# Patient Record
Sex: MALE | Race: WHITE | Employment: OTHER | URBAN - METROPOLITAN AREA
[De-identification: names, ages, dates, MRNs, and addresses within clinical notes are randomized per-mention and may not be internally consistent; named-entity substitution may affect disease eponyms.]

---

## 2017-04-01 ENCOUNTER — HOSPITAL ENCOUNTER (OUTPATIENT)
Dept: OTHER | Age: 67
Discharge: HOME | End: 2017-04-01

## 2017-04-01 DIAGNOSIS — R50.9 FEVER AND CHILLS: ICD-10-CM

## 2017-04-01 DIAGNOSIS — R05.9 COUGH: ICD-10-CM

## 2019-10-03 ENCOUNTER — OFFICE VISIT (OUTPATIENT)
Dept: CARDIOLOGY CLINIC | Age: 69
End: 2019-10-03
Payer: COMMERCIAL

## 2019-10-03 VITALS
HEART RATE: 62 BPM | HEIGHT: 67 IN | BODY MASS INDEX: 29.51 KG/M2 | WEIGHT: 188 LBS | SYSTOLIC BLOOD PRESSURE: 124 MMHG | DIASTOLIC BLOOD PRESSURE: 64 MMHG

## 2019-10-03 DIAGNOSIS — I25.10 ATHEROSCLEROSIS OF NATIVE CORONARY ARTERY OF NATIVE HEART WITHOUT ANGINA PECTORIS: ICD-10-CM

## 2019-10-03 DIAGNOSIS — I10 ESSENTIAL HYPERTENSION, BENIGN: ICD-10-CM

## 2019-10-03 DIAGNOSIS — Z95.1 S/P CABG X 1: Primary | ICD-10-CM

## 2019-10-03 PROCEDURE — 93000 ELECTROCARDIOGRAM COMPLETE: CPT | Performed by: INTERNAL MEDICINE

## 2019-10-03 PROCEDURE — 99204 OFFICE O/P NEW MOD 45 MIN: CPT | Performed by: INTERNAL MEDICINE

## 2019-10-03 PROCEDURE — 1036F TOBACCO NON-USER: CPT | Performed by: INTERNAL MEDICINE

## 2019-10-03 PROCEDURE — G8419 CALC BMI OUT NRM PARAM NOF/U: HCPCS | Performed by: INTERNAL MEDICINE

## 2019-10-03 PROCEDURE — G8427 DOCREV CUR MEDS BY ELIG CLIN: HCPCS | Performed by: INTERNAL MEDICINE

## 2019-10-03 PROCEDURE — 1123F ACP DISCUSS/DSCN MKR DOCD: CPT | Performed by: INTERNAL MEDICINE

## 2019-10-03 PROCEDURE — 4040F PNEUMOC VAC/ADMIN/RCVD: CPT | Performed by: INTERNAL MEDICINE

## 2019-10-03 PROCEDURE — G8598 ASA/ANTIPLAT THER USED: HCPCS | Performed by: INTERNAL MEDICINE

## 2019-10-03 PROCEDURE — G8484 FLU IMMUNIZE NO ADMIN: HCPCS | Performed by: INTERNAL MEDICINE

## 2019-10-03 PROCEDURE — 3017F COLORECTAL CA SCREEN DOC REV: CPT | Performed by: INTERNAL MEDICINE

## 2019-10-03 RX ORDER — TERAZOSIN 2 MG/1
5 CAPSULE ORAL NIGHTLY
COMMUNITY

## 2019-10-09 RX ORDER — IBUPROFEN 800 MG/1
800 TABLET ORAL NIGHTLY
COMMUNITY

## 2019-10-11 ENCOUNTER — ANESTHESIA EVENT (OUTPATIENT)
Dept: OPERATING ROOM | Age: 69
End: 2019-10-11
Payer: COMMERCIAL

## 2019-10-11 ENCOUNTER — HOSPITAL ENCOUNTER (OUTPATIENT)
Age: 69
Setting detail: OUTPATIENT SURGERY
Discharge: HOME | End: 2019-10-11
Attending: OPHTHALMOLOGY | Admitting: OPHTHALMOLOGY
Payer: COMMERCIAL

## 2019-10-11 ENCOUNTER — ANESTHESIA (OUTPATIENT)
Dept: OPERATING ROOM | Age: 69
End: 2019-10-11
Payer: COMMERCIAL

## 2019-10-11 VITALS
OXYGEN SATURATION: 98 % | HEIGHT: 67 IN | WEIGHT: 187 LBS | HEART RATE: 57 BPM | DIASTOLIC BLOOD PRESSURE: 70 MMHG | SYSTOLIC BLOOD PRESSURE: 149 MMHG | RESPIRATION RATE: 16 BRPM | BODY MASS INDEX: 29.35 KG/M2 | TEMPERATURE: 97.2 F

## 2019-10-11 VITALS — SYSTOLIC BLOOD PRESSURE: 148 MMHG | DIASTOLIC BLOOD PRESSURE: 82 MMHG | OXYGEN SATURATION: 100 %

## 2019-10-11 PROCEDURE — 6370000000 HC RX 637 (ALT 250 FOR IP): Performed by: OPHTHALMOLOGY

## 2019-10-11 PROCEDURE — 2709999900 HC NON-CHARGEABLE SUPPLY: Performed by: OPHTHALMOLOGY

## 2019-10-11 PROCEDURE — 3700000000 HC ANESTHESIA ATTENDED CARE: Performed by: OPHTHALMOLOGY

## 2019-10-11 PROCEDURE — 2580000003 HC RX 258: Performed by: OPHTHALMOLOGY

## 2019-10-11 PROCEDURE — 2500000003 HC RX 250 WO HCPCS: Performed by: NURSE ANESTHETIST, CERTIFIED REGISTERED

## 2019-10-11 PROCEDURE — 2720000010 HC SURG SUPPLY STERILE: Performed by: OPHTHALMOLOGY

## 2019-10-11 PROCEDURE — 6360000002 HC RX W HCPCS: Performed by: OPHTHALMOLOGY

## 2019-10-11 PROCEDURE — V2632 POST CHMBR INTRAOCULAR LENS: HCPCS | Performed by: OPHTHALMOLOGY

## 2019-10-11 PROCEDURE — 3700000001 HC ADD 15 MINUTES (ANESTHESIA): Performed by: OPHTHALMOLOGY

## 2019-10-11 PROCEDURE — 7100000010 HC PHASE II RECOVERY - FIRST 15 MIN: Performed by: OPHTHALMOLOGY

## 2019-10-11 PROCEDURE — 6360000002 HC RX W HCPCS: Performed by: NURSE ANESTHETIST, CERTIFIED REGISTERED

## 2019-10-11 PROCEDURE — 6370000000 HC RX 637 (ALT 250 FOR IP)

## 2019-10-11 PROCEDURE — 2580000003 HC RX 258: Performed by: ANESTHESIOLOGY

## 2019-10-11 PROCEDURE — 3600000003 HC SURGERY LEVEL 3 BASE: Performed by: OPHTHALMOLOGY

## 2019-10-11 PROCEDURE — 3600000013 HC SURGERY LEVEL 3 ADDTL 15MIN: Performed by: OPHTHALMOLOGY

## 2019-10-11 PROCEDURE — 7100000011 HC PHASE II RECOVERY - ADDTL 15 MIN: Performed by: OPHTHALMOLOGY

## 2019-10-11 PROCEDURE — 2500000003 HC RX 250 WO HCPCS: Performed by: OPHTHALMOLOGY

## 2019-10-11 DEVICE — ACRYSOF(R) IQ ASPHERIC IOL SP ACRYLIC FOLDABLELENS WULTRASERT(TM) DELIVERY SYSTEM UV WBLUE LIGHT FILTER. 13.0MM LENGTH 6.0MM ANTERIORASYMMETRIC BICONVEX OPTIC PLANAR HAPTICS.
Type: IMPLANTABLE DEVICE | Site: EYE | Status: FUNCTIONAL
Brand: ACRYSOF ULTRASERT

## 2019-10-11 RX ORDER — SODIUM CHLORIDE, SODIUM LACTATE, POTASSIUM CHLORIDE, CALCIUM CHLORIDE 600; 310; 30; 20 MG/100ML; MG/100ML; MG/100ML; MG/100ML
INJECTION, SOLUTION INTRAVENOUS CONTINUOUS
Status: DISCONTINUED | OUTPATIENT
Start: 2019-10-11 | End: 2019-10-11 | Stop reason: HOSPADM

## 2019-10-11 RX ORDER — MAGNESIUM HYDROXIDE 1200 MG/15ML
LIQUID ORAL PRN
Status: DISCONTINUED | OUTPATIENT
Start: 2019-10-11 | End: 2019-10-11 | Stop reason: ALTCHOICE

## 2019-10-11 RX ORDER — OXYCODONE HYDROCHLORIDE AND ACETAMINOPHEN 5; 325 MG/1; MG/1
2 TABLET ORAL PRN
Status: DISCONTINUED | OUTPATIENT
Start: 2019-10-11 | End: 2019-10-11 | Stop reason: HOSPADM

## 2019-10-11 RX ORDER — OXYCODONE HYDROCHLORIDE AND ACETAMINOPHEN 5; 325 MG/1; MG/1
1 TABLET ORAL PRN
Status: DISCONTINUED | OUTPATIENT
Start: 2019-10-11 | End: 2019-10-11 | Stop reason: HOSPADM

## 2019-10-11 RX ORDER — MORPHINE SULFATE 2 MG/ML
1 INJECTION, SOLUTION INTRAMUSCULAR; INTRAVENOUS EVERY 5 MIN PRN
Status: DISCONTINUED | OUTPATIENT
Start: 2019-10-11 | End: 2019-10-11 | Stop reason: HOSPADM

## 2019-10-11 RX ORDER — LABETALOL HYDROCHLORIDE 5 MG/ML
5 INJECTION, SOLUTION INTRAVENOUS EVERY 10 MIN PRN
Status: DISCONTINUED | OUTPATIENT
Start: 2019-10-11 | End: 2019-10-11 | Stop reason: HOSPADM

## 2019-10-11 RX ORDER — MEPERIDINE HYDROCHLORIDE 50 MG/ML
12.5 INJECTION INTRAMUSCULAR; INTRAVENOUS; SUBCUTANEOUS EVERY 5 MIN PRN
Status: DISCONTINUED | OUTPATIENT
Start: 2019-10-11 | End: 2019-10-11 | Stop reason: HOSPADM

## 2019-10-11 RX ORDER — MORPHINE SULFATE 2 MG/ML
2 INJECTION, SOLUTION INTRAMUSCULAR; INTRAVENOUS EVERY 5 MIN PRN
Status: DISCONTINUED | OUTPATIENT
Start: 2019-10-11 | End: 2019-10-11 | Stop reason: HOSPADM

## 2019-10-11 RX ORDER — LIDOCAINE HYDROCHLORIDE 20 MG/ML
INJECTION, SOLUTION EPIDURAL; INFILTRATION; INTRACAUDAL; PERINEURAL PRN
Status: DISCONTINUED | OUTPATIENT
Start: 2019-10-11 | End: 2019-10-11 | Stop reason: SDUPTHER

## 2019-10-11 RX ORDER — DIPHENHYDRAMINE HYDROCHLORIDE 50 MG/ML
12.5 INJECTION INTRAMUSCULAR; INTRAVENOUS
Status: DISCONTINUED | OUTPATIENT
Start: 2019-10-11 | End: 2019-10-11 | Stop reason: HOSPADM

## 2019-10-11 RX ORDER — PROPOFOL 10 MG/ML
INJECTION, EMULSION INTRAVENOUS PRN
Status: DISCONTINUED | OUTPATIENT
Start: 2019-10-11 | End: 2019-10-11 | Stop reason: SDUPTHER

## 2019-10-11 RX ORDER — HYDRALAZINE HYDROCHLORIDE 20 MG/ML
5 INJECTION INTRAMUSCULAR; INTRAVENOUS EVERY 10 MIN PRN
Status: DISCONTINUED | OUTPATIENT
Start: 2019-10-11 | End: 2019-10-11 | Stop reason: HOSPADM

## 2019-10-11 RX ORDER — PROMETHAZINE HYDROCHLORIDE 25 MG/ML
6.25 INJECTION, SOLUTION INTRAMUSCULAR; INTRAVENOUS
Status: DISCONTINUED | OUTPATIENT
Start: 2019-10-11 | End: 2019-10-11 | Stop reason: HOSPADM

## 2019-10-11 RX ORDER — ONDANSETRON 2 MG/ML
4 INJECTION INTRAMUSCULAR; INTRAVENOUS
Status: DISCONTINUED | OUTPATIENT
Start: 2019-10-11 | End: 2019-10-11 | Stop reason: HOSPADM

## 2019-10-11 RX ORDER — LIDOCAINE HYDROCHLORIDE 10 MG/ML
2 INJECTION, SOLUTION INFILTRATION; PERINEURAL
Status: DISCONTINUED | OUTPATIENT
Start: 2019-10-11 | End: 2019-10-11 | Stop reason: HOSPADM

## 2019-10-11 RX ORDER — BACITRACIN 500 [USP'U]/G
OINTMENT OPHTHALMIC PRN
Status: DISCONTINUED | OUTPATIENT
Start: 2019-10-11 | End: 2019-10-11 | Stop reason: ALTCHOICE

## 2019-10-11 RX ADMIN — SODIUM CHLORIDE, POTASSIUM CHLORIDE, SODIUM LACTATE AND CALCIUM CHLORIDE: 600; 310; 30; 20 INJECTION, SOLUTION INTRAVENOUS at 07:22

## 2019-10-11 RX ADMIN — SODIUM CHLORIDE, POTASSIUM CHLORIDE, SODIUM LACTATE AND CALCIUM CHLORIDE: 600; 310; 30; 20 INJECTION, SOLUTION INTRAVENOUS at 08:33

## 2019-10-11 RX ADMIN — Medication 0.3 ML: at 07:09

## 2019-10-11 RX ADMIN — PROPOFOL 80 MG: 10 INJECTION, EMULSION INTRAVENOUS at 08:37

## 2019-10-11 RX ADMIN — LIDOCAINE HYDROCHLORIDE 60 MG: 20 INJECTION, SOLUTION EPIDURAL; INFILTRATION; INTRACAUDAL; PERINEURAL at 08:37

## 2019-10-11 ASSESSMENT — PULMONARY FUNCTION TESTS
PIF_VALUE: 1

## 2019-10-11 ASSESSMENT — PAIN - FUNCTIONAL ASSESSMENT: PAIN_FUNCTIONAL_ASSESSMENT: 0-10

## 2019-10-11 ASSESSMENT — PAIN SCALES - GENERAL: PAINLEVEL_OUTOF10: 0

## 2019-10-24 ENCOUNTER — ANESTHESIA EVENT (OUTPATIENT)
Dept: OPERATING ROOM | Age: 69
End: 2019-10-24
Payer: MEDICARE

## 2019-10-25 ENCOUNTER — HOSPITAL ENCOUNTER (OUTPATIENT)
Age: 69
Setting detail: OUTPATIENT SURGERY
Discharge: HOME | End: 2019-10-25
Attending: OPHTHALMOLOGY | Admitting: OPHTHALMOLOGY
Payer: MEDICARE

## 2019-10-25 ENCOUNTER — ANESTHESIA (OUTPATIENT)
Dept: OPERATING ROOM | Age: 69
End: 2019-10-25
Payer: MEDICARE

## 2019-10-25 VITALS — DIASTOLIC BLOOD PRESSURE: 81 MMHG | OXYGEN SATURATION: 99 % | SYSTOLIC BLOOD PRESSURE: 143 MMHG

## 2019-10-25 VITALS
HEIGHT: 67 IN | WEIGHT: 180 LBS | BODY MASS INDEX: 28.25 KG/M2 | RESPIRATION RATE: 16 BRPM | OXYGEN SATURATION: 98 % | TEMPERATURE: 97.2 F | SYSTOLIC BLOOD PRESSURE: 148 MMHG | DIASTOLIC BLOOD PRESSURE: 81 MMHG | HEART RATE: 62 BPM

## 2019-10-25 PROCEDURE — 6370000000 HC RX 637 (ALT 250 FOR IP)

## 2019-10-25 PROCEDURE — 2720000010 HC SURG SUPPLY STERILE: Performed by: OPHTHALMOLOGY

## 2019-10-25 PROCEDURE — 3700000000 HC ANESTHESIA ATTENDED CARE: Performed by: OPHTHALMOLOGY

## 2019-10-25 PROCEDURE — 3700000001 HC ADD 15 MINUTES (ANESTHESIA): Performed by: OPHTHALMOLOGY

## 2019-10-25 PROCEDURE — 2500000003 HC RX 250 WO HCPCS: Performed by: ANESTHESIOLOGY

## 2019-10-25 PROCEDURE — 2709999900 HC NON-CHARGEABLE SUPPLY: Performed by: OPHTHALMOLOGY

## 2019-10-25 PROCEDURE — 7100000010 HC PHASE II RECOVERY - FIRST 15 MIN: Performed by: OPHTHALMOLOGY

## 2019-10-25 PROCEDURE — 6360000002 HC RX W HCPCS: Performed by: NURSE ANESTHETIST, CERTIFIED REGISTERED

## 2019-10-25 PROCEDURE — 2500000003 HC RX 250 WO HCPCS: Performed by: OPHTHALMOLOGY

## 2019-10-25 PROCEDURE — 3600000013 HC SURGERY LEVEL 3 ADDTL 15MIN: Performed by: OPHTHALMOLOGY

## 2019-10-25 PROCEDURE — 7100000011 HC PHASE II RECOVERY - ADDTL 15 MIN: Performed by: OPHTHALMOLOGY

## 2019-10-25 PROCEDURE — 3600000003 HC SURGERY LEVEL 3 BASE: Performed by: OPHTHALMOLOGY

## 2019-10-25 PROCEDURE — V2632 POST CHMBR INTRAOCULAR LENS: HCPCS | Performed by: OPHTHALMOLOGY

## 2019-10-25 PROCEDURE — 2580000003 HC RX 258: Performed by: OPHTHALMOLOGY

## 2019-10-25 PROCEDURE — 6360000002 HC RX W HCPCS: Performed by: OPHTHALMOLOGY

## 2019-10-25 PROCEDURE — 2580000003 HC RX 258: Performed by: ANESTHESIOLOGY

## 2019-10-25 PROCEDURE — 6370000000 HC RX 637 (ALT 250 FOR IP): Performed by: OPHTHALMOLOGY

## 2019-10-25 PROCEDURE — 2500000003 HC RX 250 WO HCPCS: Performed by: NURSE ANESTHETIST, CERTIFIED REGISTERED

## 2019-10-25 DEVICE — ACRYSOF(R) IQ ASPHERIC IOL SP ACRYLIC FOLDABLELENS WULTRASERT(TM) DELIVERY SYSTEM UV WBLUE LIGHT FILTER. 13.0MM LENGTH 6.0MM ANTERIORASYMMETRIC BICONVEX OPTIC PLANAR HAPTICS.
Type: IMPLANTABLE DEVICE | Site: EYE | Status: FUNCTIONAL
Brand: ACRYSOF ULTRASERT

## 2019-10-25 RX ORDER — PROMETHAZINE HYDROCHLORIDE 25 MG/ML
6.25 INJECTION, SOLUTION INTRAMUSCULAR; INTRAVENOUS
Status: DISCONTINUED | OUTPATIENT
Start: 2019-10-25 | End: 2019-10-25 | Stop reason: HOSPADM

## 2019-10-25 RX ORDER — SODIUM CHLORIDE, POTASSIUM CHLORIDE, CALCIUM CHLORIDE, MAGNESIUM CHLORIDE, SODIUM ACETATE, AND SODIUM CITRATE 6.4; .75; .48; .3; 3.9; 1.7 MG/ML; MG/ML; MG/ML; MG/ML; MG/ML; MG/ML
SOLUTION IRRIGATION PRN
Status: DISCONTINUED | OUTPATIENT
Start: 2019-10-25 | End: 2019-10-25 | Stop reason: ALTCHOICE

## 2019-10-25 RX ORDER — SODIUM CHLORIDE 0.9 % (FLUSH) 0.9 %
10 SYRINGE (ML) INJECTION EVERY 12 HOURS SCHEDULED
Status: DISCONTINUED | OUTPATIENT
Start: 2019-10-25 | End: 2019-10-25 | Stop reason: HOSPADM

## 2019-10-25 RX ORDER — SODIUM CHLORIDE 0.9 % (FLUSH) 0.9 %
10 SYRINGE (ML) INJECTION PRN
Status: DISCONTINUED | OUTPATIENT
Start: 2019-10-25 | End: 2019-10-25 | Stop reason: HOSPADM

## 2019-10-25 RX ORDER — LIDOCAINE HYDROCHLORIDE 20 MG/ML
INJECTION, SOLUTION INFILTRATION; PERINEURAL PRN
Status: DISCONTINUED | OUTPATIENT
Start: 2019-10-25 | End: 2019-10-25 | Stop reason: SDUPTHER

## 2019-10-25 RX ORDER — LABETALOL HYDROCHLORIDE 5 MG/ML
5 INJECTION, SOLUTION INTRAVENOUS EVERY 10 MIN PRN
Status: DISCONTINUED | OUTPATIENT
Start: 2019-10-25 | End: 2019-10-25 | Stop reason: HOSPADM

## 2019-10-25 RX ORDER — BACITRACIN 500 [USP'U]/G
OINTMENT OPHTHALMIC PRN
Status: DISCONTINUED | OUTPATIENT
Start: 2019-10-25 | End: 2019-10-25 | Stop reason: ALTCHOICE

## 2019-10-25 RX ORDER — BRIMONIDINE TARTRATE 2 MG/ML
SOLUTION/ DROPS OPHTHALMIC PRN
Status: DISCONTINUED | OUTPATIENT
Start: 2019-10-25 | End: 2019-10-25 | Stop reason: ALTCHOICE

## 2019-10-25 RX ORDER — DIPHENHYDRAMINE HYDROCHLORIDE 50 MG/ML
12.5 INJECTION INTRAMUSCULAR; INTRAVENOUS
Status: DISCONTINUED | OUTPATIENT
Start: 2019-10-25 | End: 2019-10-25 | Stop reason: HOSPADM

## 2019-10-25 RX ORDER — MAGNESIUM HYDROXIDE 1200 MG/15ML
LIQUID ORAL PRN
Status: DISCONTINUED | OUTPATIENT
Start: 2019-10-25 | End: 2019-10-25 | Stop reason: ALTCHOICE

## 2019-10-25 RX ORDER — SODIUM CHLORIDE, SODIUM LACTATE, POTASSIUM CHLORIDE, CALCIUM CHLORIDE 600; 310; 30; 20 MG/100ML; MG/100ML; MG/100ML; MG/100ML
INJECTION, SOLUTION INTRAVENOUS CONTINUOUS
Status: DISCONTINUED | OUTPATIENT
Start: 2019-10-25 | End: 2019-10-25 | Stop reason: HOSPADM

## 2019-10-25 RX ORDER — PROPOFOL 10 MG/ML
INJECTION, EMULSION INTRAVENOUS PRN
Status: DISCONTINUED | OUTPATIENT
Start: 2019-10-25 | End: 2019-10-25 | Stop reason: SDUPTHER

## 2019-10-25 RX ORDER — TETRACAINE HYDROCHLORIDE 5 MG/ML
SOLUTION OPHTHALMIC PRN
Status: DISCONTINUED | OUTPATIENT
Start: 2019-10-25 | End: 2019-10-25 | Stop reason: ALTCHOICE

## 2019-10-25 RX ORDER — MOXIFLOXACIN 5 MG/ML
SOLUTION/ DROPS OPHTHALMIC PRN
Status: DISCONTINUED | OUTPATIENT
Start: 2019-10-25 | End: 2019-10-25 | Stop reason: ALTCHOICE

## 2019-10-25 RX ORDER — ONDANSETRON 2 MG/ML
4 INJECTION INTRAMUSCULAR; INTRAVENOUS
Status: DISCONTINUED | OUTPATIENT
Start: 2019-10-25 | End: 2019-10-25 | Stop reason: HOSPADM

## 2019-10-25 RX ORDER — FENTANYL CITRATE 50 UG/ML
25 INJECTION, SOLUTION INTRAMUSCULAR; INTRAVENOUS EVERY 5 MIN PRN
Status: DISCONTINUED | OUTPATIENT
Start: 2019-10-25 | End: 2019-10-25 | Stop reason: HOSPADM

## 2019-10-25 RX ORDER — HYDRALAZINE HYDROCHLORIDE 20 MG/ML
5 INJECTION INTRAMUSCULAR; INTRAVENOUS EVERY 10 MIN PRN
Status: DISCONTINUED | OUTPATIENT
Start: 2019-10-25 | End: 2019-10-25 | Stop reason: HOSPADM

## 2019-10-25 RX ADMIN — SODIUM CHLORIDE, POTASSIUM CHLORIDE, SODIUM LACTATE AND CALCIUM CHLORIDE: 600; 310; 30; 20 INJECTION, SOLUTION INTRAVENOUS at 08:33

## 2019-10-25 RX ADMIN — FAMOTIDINE 20 MG: 10 INJECTION, SOLUTION INTRAVENOUS at 08:36

## 2019-10-25 RX ADMIN — SODIUM CHLORIDE, POTASSIUM CHLORIDE, SODIUM LACTATE AND CALCIUM CHLORIDE: 600; 310; 30; 20 INJECTION, SOLUTION INTRAVENOUS at 09:25

## 2019-10-25 RX ADMIN — Medication 0.3 ML: at 08:24

## 2019-10-25 RX ADMIN — LIDOCAINE HYDROCHLORIDE 60 MG: 20 INJECTION, SOLUTION INFILTRATION; PERINEURAL at 09:28

## 2019-10-25 RX ADMIN — PROPOFOL 70 MG: 10 INJECTION, EMULSION INTRAVENOUS at 09:29

## 2019-10-25 ASSESSMENT — PULMONARY FUNCTION TESTS
PIF_VALUE: 1

## 2019-10-25 ASSESSMENT — PAIN - FUNCTIONAL ASSESSMENT: PAIN_FUNCTIONAL_ASSESSMENT: 0-10

## 2020-02-17 ENCOUNTER — HOSPITAL ENCOUNTER (OUTPATIENT)
Dept: NON INVASIVE DIAGNOSTICS | Age: 70
Discharge: HOME | End: 2020-02-17
Payer: MEDICARE

## 2020-02-17 LAB
LV EF: 45 %
LVEF MODALITY: NORMAL

## 2020-02-17 PROCEDURE — 93306 TTE W/DOPPLER COMPLETE: CPT

## 2020-02-18 ENCOUNTER — TELEPHONE (OUTPATIENT)
Dept: CARDIOLOGY CLINIC | Age: 70
End: 2020-02-18

## 2020-02-18 NOTE — TELEPHONE ENCOUNTER
----- Message from Micah Lee MD sent at 2/17/2020  5:55 PM EST -----  Call  Echo stable  Heart fxn mild reduced, unchanged

## 2020-06-04 ENCOUNTER — VIRTUAL VISIT (OUTPATIENT)
Dept: CARDIOLOGY CLINIC | Age: 70
End: 2020-06-04
Payer: MEDICARE

## 2020-06-04 VITALS
BODY MASS INDEX: 29.03 KG/M2 | SYSTOLIC BLOOD PRESSURE: 146 MMHG | HEIGHT: 67 IN | WEIGHT: 185 LBS | DIASTOLIC BLOOD PRESSURE: 80 MMHG | HEART RATE: 65 BPM

## 2020-06-04 PROCEDURE — 1123F ACP DISCUSS/DSCN MKR DOCD: CPT | Performed by: INTERNAL MEDICINE

## 2020-06-04 PROCEDURE — 99214 OFFICE O/P EST MOD 30 MIN: CPT | Performed by: INTERNAL MEDICINE

## 2020-06-04 PROCEDURE — 4040F PNEUMOC VAC/ADMIN/RCVD: CPT | Performed by: INTERNAL MEDICINE

## 2020-06-04 PROCEDURE — 3017F COLORECTAL CA SCREEN DOC REV: CPT | Performed by: INTERNAL MEDICINE

## 2020-06-04 PROCEDURE — G8427 DOCREV CUR MEDS BY ELIG CLIN: HCPCS | Performed by: INTERNAL MEDICINE

## 2020-06-04 RX ORDER — FAMOTIDINE 20 MG/1
20 TABLET, FILM COATED ORAL DAILY
COMMUNITY

## 2020-06-04 RX ORDER — ATENOLOL 50 MG/1
50 TABLET ORAL DAILY
Qty: 90 TABLET | Refills: 3 | Status: ON HOLD | OUTPATIENT
Start: 2020-06-04 | End: 2022-05-17 | Stop reason: SDUPTHER

## 2020-06-04 NOTE — PATIENT INSTRUCTIONS
Plan:   Increase Atenolol to 50 mg daily for better blood pressure control   Recommend a sleep study   Lipids checked through [address]  Continue other medications   Check blood pressure at home daily- call the office in a few weeks if you are consistently running >140/90  Regular exercise and following a healthy diet encouraged   Follow up with me in 1 year

## 2020-06-04 NOTE — PROGRESS NOTES
tablet Take 1,000 Units by mouth 2 times daily Yes Historical Provider, MD   sildenafil (REVATIO) 20 MG tablet Take 20 mg by mouth as needed Yes Historical Provider, MD   atorvastatin (LIPITOR) 40 MG tablet Take 1 tablet by mouth daily. Patient taking differently: Take 40 mg by mouth nightly  Yes Rosita Humphrey MD   aspirin 81 MG tablet Take 81 mg by mouth daily. Yes Historical Provider, MD   nitroGLYCERIN (NITROSTAT) 0.4 MG SL tablet Place 1 tablet under the tongue every 5 minutes as needed. Yes Rosita Humphrey MD   clopidogrel (PLAVIX) 75 MG tablet Take 1 tablet by mouth daily. Yes Rosita Humphrey MD   Multiple Vitamins-Minerals [facility] COMPLETE PO) Take 1 tablet by mouth daily. Yes Historical Provider, MD   atenolol (TENORMIN) 25 MG tablet Take 25 mg by mouth daily. Yes Historical Provider, MD   diazepam (VALIUM) 10 MG tablet Take 10 mg by mouth nightly. Yes Historical Provider, MD   finasteride (PROSCAR) 5 MG tablet Take 5 mg by mouth daily.    Yes Historical Provider, MD       Social History     Tobacco Use    Smoking status: Former Smoker     Packs/day: 3.50     Last attempt to quit: 1983     Years since quittin.3    Smokeless tobacco: Never Used   Substance Use Topics    Alcohol use: No    Drug use: No          Allergies   Allergen Reactions    Other Other (See Comments)     MSG-  H/A's, dizziness, N/V    Codeine Itching    Vicodin [Hydrocodone-Acetaminophen] Itching   ,   Past Medical History:   Diagnosis Date    BPH (benign prostatic hyperplasia)     CAD (coronary artery disease)     Chronic back pain     Gastric ulcer     past hx    GERD (gastroesophageal reflux disease)     Hyperlipidemia     Hypertension     [facility]    Osteoarthritis     Sciatic pain    ,   Past Surgical History:   Procedure Laterality Date    CORONARY ANGIOPLASTY WITH STENT PLACEMENT      Total of 14 stents [id number]    CORONARY ARTERY BYPASS GRAFT      Constitutional: [x] Appears well-developed and well-nourished [] No apparent distress      [] Abnormal-   Mental status  [x] Alert and awake  [] Oriented to person/place/time []Able to follow commands      Eyes:  EOM    [x]  Normal  [] Abnormal-  Sclera  [x]  Normal  [] Abnormal -         Discharge []  None visible  [] Abnormal -    HENT:   [x] Normocephalic, atraumatic. [] Abnormal   [x] Mouth/Throat: Mucous membranes are moist.     External Ears [x] Normal  [] Abnormal-     Neck: [x] No visualized mass     Pulmonary/Chest: [x] Respiratory effort normal.  [] No visualized signs of difficulty breathing or respiratory distress        [] Abnormal-      Musculoskeletal:   [x] Normal gait with no signs of ataxia         [x] Normal range of motion of neck        [] Abnormal-       Neurological:        [x] No Facial Asymmetry (Cranial nerve 7 motor function) (limited exam to video visit)          [x] No gaze palsy        [] Abnormal-         Skin:        [x] No significant exanthematous lesions or discoloration noted on facial skin         [] Abnormal-            Psychiatric:       [x] Normal Affect [] No Hallucinations        [] Abnormal-     Other pertinent observable physical exam findings-     Myoview 9/5/13   Moderate sized posterobasal and inferolateral fixed defect consistent with infarction in the territory of the mid and distal LCx and/or RCA . LV function is mildly reduced with inferior hypokinesis and ejection fraction of 48 %.    Echocardiogram 9/10/13  Summary  Normal left ventricle size and wall thickness. The left ventricular systolic  function appears reduced with an ejection fraction of 45-50%. There is mild  hypokinesis and myocardial thinning of the posterior wall  Mild mitral, aortic and tricuspid regurgitation. RVSP estimated at 29 mmHg. The left atrium is mildly dilated.      Cath 1/2015  LM <20%  LAD 70% mid, FFR 0.66  Cx mid stent patent  %   SVG to RPDA patent  LV: inferior hypokinesis, EF 45%  PTCA LAD  2.25 x 23 AVA    Echocardiogram 2/17/2020   Summary   The left ventricular systolic function is mildly reduced with an ejection   fraction of 45 %. There is akinesis of the basal/mid inferior and basal/mid inferolateral   walls. Normal left ventricular diastolic filling pressure. The left atrium is mildly dilated. The aortic root is mildly dilated at 3.8cm. Mild mitral regurgitation. Mild aortic regurgitation. Systolic pulmonary artery pressure (SPAP) is normal estimated at 12 mmHg   (Right atrial pressure of 3 mmHg). ASSESSMENT:  CAD- Multiple PCI of RCA in past including beta radiation. Recurrent restenosis RCA required 1V-CABG - SVG to PDA. PCI/AVA LAD 1/2015. Stable. HTN - suboptimal  HLD - [facility] at  [facility] - not recent ck  Shortness of breath       Plan:   Increase Atenolol to 50 mg daily for better blood pressure control   Recommend a sleep study   Lipids checked through [place]  Continue other medications   Check blood pressure at home daily- call the office in a few weeks if you are consistently running >140/90  Regular exercise and following a healthy diet encouraged   Follow up with me in 1 year         Lauren Andersen is a 68 y.o. male being evaluated by a Virtual Visit (video visit) encounter to address concerns as mentioned above. A caregiver was present when appropriate. Due to this being a TeleHealth encounter (During TXUMO-09 public health emergency), evaluation of the following organ systems was limited: Vitals/Constitutional/EENT/Resp/CV/GI//MS/Neuro/Skin/Heme-Lymph-Imm. Pursuant to the emergency declaration under the [address] [place] [address], [address] [place] [address] authority and the [employer] Act, this Virtual Visit was conducted with patient's (and/or legal guardian's) consent, to reduce the patient's risk of exposure to COVID-19 and provide necessary medical care.   The patient (and/or legal guardian) has also been advised to contact this office for worsening conditions or problems, and seek emergency medical treatment and/or call 911 if deemed necessary. Scribe's attestation: This note was scribed in the presence of Dr. Dax Gerardo M.D. By Sowmya Morley were provided through a video synchronous discussion virtually to substitute for in-person clinic visit. I am seeing the patient today from my office and the patient from their home. --Ron Traore RN on 6/4/2020 at 1:59 PM    The scribes documentation has been prepared under my direction and personally reviewed by me in its entirety. I confirm that the note above accurately reflects all work, treatment, procedures, and medical decision making performed by me. Dr. Dax Gerardo MD      An electronic signature was used to authenticate this note.

## 2020-06-05 ENCOUNTER — TELEPHONE (OUTPATIENT)
Dept: PULMONOLOGY | Age: 70
End: 2020-06-05

## 2020-06-05 NOTE — TELEPHONE ENCOUNTER
limited to the following:    Your Provider(s) may not able to provide medical treatment for your particular condition and you may be required to seek alternative healthcare or emergency care services.  The electronic systems or other security protocols or safeguards used in the Service could fail, causing a breach of privacy of your medical or other information.  Given regulatory requirements in certain jurisdictions, your Provider(s) diagnosis and/or treatment options, especially pertaining to certain prescriptions, may be limited. Acceptance   1. You understand that Services will be provided via Telehealth. This process involves the use of HIPAA compliant and secure, real-time audio-visual interfacing with a qualified and appropriately trained provider located at [facility]. 2. You understand that, under no circumstances, will this session be recorded. 3. You understand that the Provider(s) at [facility] and other clinical participants will be party to the information obtained during the Telehealth session in accordance with best medical practices. 4. You understand that the information obtained during the Telehealth session will be used to help determine the most appropriate treatment options. 5. You understand that You have the right to revoke this consent at any point in time. 6. You understand that Telehealth is voluntary, and that continued treatment is not dependent upon consent. 7. You understand that, in the event of non-consent to Telehealth services and/or technical difficulties, you will obtain services as typically provided in the absence of Telehealth technology. 8. You understand that this consent will be kept in Your medical record. 9. No potential benefits from the use of Telehealth or specific results can be guaranteed. Your condition may not be cured or improved and, in some cases, may get worse.    10. There are limitations in

## 2020-07-08 ENCOUNTER — VIRTUAL VISIT (OUTPATIENT)
Dept: PULMONOLOGY | Age: 70
End: 2020-07-08
Payer: MEDICARE

## 2020-07-08 PROCEDURE — 4040F PNEUMOC VAC/ADMIN/RCVD: CPT | Performed by: INTERNAL MEDICINE

## 2020-07-08 PROCEDURE — 1123F ACP DISCUSS/DSCN MKR DOCD: CPT | Performed by: INTERNAL MEDICINE

## 2020-07-08 PROCEDURE — 99204 OFFICE O/P NEW MOD 45 MIN: CPT | Performed by: INTERNAL MEDICINE

## 2020-07-08 PROCEDURE — G8417 CALC BMI ABV UP PARAM F/U: HCPCS | Performed by: INTERNAL MEDICINE

## 2020-07-08 PROCEDURE — G8427 DOCREV CUR MEDS BY ELIG CLIN: HCPCS | Performed by: INTERNAL MEDICINE

## 2020-07-08 PROCEDURE — 1036F TOBACCO NON-USER: CPT | Performed by: INTERNAL MEDICINE

## 2020-07-08 PROCEDURE — 3017F COLORECTAL CA SCREEN DOC REV: CPT | Performed by: INTERNAL MEDICINE

## 2020-07-08 ASSESSMENT — SLEEP AND FATIGUE QUESTIONNAIRES
HOW LIKELY ARE YOU TO NOD OFF OR FALL ASLEEP IN A CAR, WHILE STOPPED FOR A FEW MINUTES IN TRAFFIC: 0
ESS TOTAL SCORE: 3
HOW LIKELY ARE YOU TO NOD OFF OR FALL ASLEEP WHILE SITTING AND READING: 0
HOW LIKELY ARE YOU TO NOD OFF OR FALL ASLEEP WHILE SITTING AND TALKING TO SOMEONE: 0
HOW LIKELY ARE YOU TO NOD OFF OR FALL ASLEEP WHILE WATCHING TV: 0
HOW LIKELY ARE YOU TO NOD OFF OR FALL ASLEEP WHILE SITTING INACTIVE IN A PUBLIC PLACE: 0
HOW LIKELY ARE YOU TO NOD OFF OR FALL ASLEEP WHILE SITTING QUIETLY AFTER LUNCH WITHOUT ALCOHOL: 0
HOW LIKELY ARE YOU TO NOD OFF OR FALL ASLEEP WHEN YOU ARE A PASSENGER IN A CAR FOR AN HOUR WITHOUT A BREAK: 0
HOW LIKELY ARE YOU TO NOD OFF OR FALL ASLEEP WHILE LYING DOWN TO REST IN THE AFTERNOON WHEN CIRCUMSTANCES PERMIT: 3

## 2020-07-08 NOTE — LETTER
[facility] Pulmonary Critical Care and Sleep  [address] [facility] [address] [address]  Phone: [phone]  Fax: [phone]    Scotty Bocanegra MD        July 9, 2020       Patient: Ese Headley   MR Number: <[id number]>   YOB: 1950   Date of Visit: 7/8/2020       Dear Dr. Rodney Rivera: Thank you for the request for consultation for Adi Francis to me for the evaluation of possible MRAIA ISABEL. Below are the relevant portions of my assessment and plan of care. If you have questions, please do not hesitate to call me. I look forward to following Adelso Gary along with you.     Sincerely,        Kristofer Smith MD    CC providers:  Jay Gordon MD  [facility] [phone]  VIA [address] [facility]  [address] [place] [address] Street: [phone]

## 2020-07-08 NOTE — PROGRESS NOTES
REASON FOR CONSULTATION/CC: MARIA ISABEL, hypersomnia      Consult at request of Dr. Hugo Monroy MD  [place], [address] [facility]  for MARIA ISABEL, hypersomnia  PCP: Jerrell Ramirez MD    HISTORY OF PRESENT ILLNESS: Gloria Gomez is a 65y.o. year old male with a history of hypertension, hyperlipidemia, DJD, BPH and CAD who presents for evaluation of MARIA ISABEL and hypersomnia. Jillian Moeller is a very pleasant 78-year-old male living with his wife in [place]. He gets his primary care from Dr. Diallo Forrester in the [facility] system and Dr. Inocenet Harrell at the [facility] in [facility]. The patient was in the [facility] for 18 months, was deployed to [facility].  He was working as a , 35 years ago joined by his company, rising up [employer] and retiring as a  in 2009. He then quit for a few years, 3 years ago joined as a  in the [employer]. The patient is seen by Dr. Chris Ramos, at follow-up he complained of excessive sleepiness and difficulty with sleep, hence the consultation. The patient says since leaving the [employer] [facility], he has needed Valium as he wakes up with dreams. He only uses Valium to go back to sleep if he wakes up. He wakes up several times gasping for breath. He goes to bed around 10 or 11 PM, usually reads in bed. He has no difficulty falling asleep. He wakes up 1-3 times a night to go to the bathroom, or due to his back hurting him. He is out of bed around 6:10 AM when he is working, now wakes up around 7 AM.  He is usually refreshed in the morning. He works, he comes home and takes a nap for 20 to 30 minutes in the afternoon. He snores while he is on his back but rarely sleeps on his back due to back pain. He usually sleeps on his left side. He used to wake up with severe cramps, but was taught some exercises that have helped. He and his wife recently obtained a sleep number bed, sleep has been better.   He does keep the bedroom quiet and cool at 60 °F.  He does have a boxer that snores, but does not sleep in the bed. He used to talk in his sleep, not since 1979. He does not wake up with a dry mouth. He has a good appetite, denies GI or  complaints. He denies lower extremity edema. He did gain about 20 pounds, has lost 3 pounds recently. He tries to walk 1 mile and a night. I have personally reviewed the patient's past medical, surgical, family and personal history. Changes were documented as needed. I have personally reviewed available radiology images. \"x\" indicates this is positive or the patient agrees.    [x] Loud Snoring [] Nighmares   [x] Frequent awakenings at night [] Teeth grinding during sleep   [x] Choking for breath at night [] Morning headaches   [x] Gasping during sleep [] Morning dry mouth   [] I've been told that I stop breathing when asleep [] Sleep walking   [] Restless sleep [] Sleep terrors   [] Awaken un-refreshed [] Tongue biting during sleep   [x] Waking up to urinate [] Bed wetting   [] Crawling feelings in legs when trying to sleep [] Acting out dreams   [] Leg kicking during sleep [] Feeling paralyzed when falling asleep or waking up    [x] Leg cramps during sleep [] Dream-like images when falling asleep   [] Trouble falling asleep at night [] Sudden weakness when laughing   [] Trouble staying asleep at night [] Uncontrollable daytime sleep attacks   [] Racing thoughts when trying to sleep [] Falling asleep unexpectedly   [] Increased muscle tension when trying to sleep [] Falling asleep at work   [] Fear of being unable to sleep [] Falling asleep at school   [] Fear of being unable to fall back asleep after wakening at night [] Falling asleep while driving   [] Laying in bed worrying when trying to sleep [] Recent change in sleep schedule   [] Waking up too early in the morning [] Shift work interfering with sleep   [] Sleep talking [] I use sleeping pills to help me sleep   [x] Sweating a lot at night: occ [] I use alcohol to help me sleep   [] Waking up with heartburn [] Pain interfering with sleep   [] Waking with reflux []        [place] Sleepiness Scale:    Sleep Medicine 7/8/2020   Sitting and reading 0   Watching TV 0   Sitting, inactive in a public place (e.g. a theatre or a meeting) 0   As a passenger in a car for an hour without a break 0   Lying down to rest in the afternoon when circumstances permit 3   Sitting and talking to someone 0   Sitting quietly after a lunch without alcohol 0   In a car, while stopped for a few minutes in traffic 0   Total score 3       PAST MEDICAL HISTORY:  Past Medical History:   Diagnosis Date    BPH (benign prostatic hyperplasia)     CAD (coronary artery disease)     Chronic back pain     Gastric ulcer     past hx    GERD (gastroesophageal reflux disease)     Hyperlipidemia     Hypertension     [facility]) ([facility]1999    Osteoarthritis     Sciatic pain        PAST SURGICAL HISTORY:  Past Surgical History:   Procedure Laterality Date    CORONARY ANGIOPLASTY WITH STENT PLACEMENT      Total of 14 stents [id number]    CORONARY ARTERY BYPASS GRAFT  2012    INTRACAPSULAR CATARACT EXTRACTION Right 10/11/2019    PHACOEMULSIFICATION OF CATARACT RIGHT EYE WITH INTRAOCULAR LENS IMPLANT performed by Brandy Graves MD at [facility] EXTRACTION Left 10/25/2019    PHACOEMULSIFICATION OF CATARACT LEFT EYE WITH INTRAOCULAR LENS IMPLANT performed by Brandy Graves MD at [address] ARTHROSCOPY  05/2010    SPINE SURGERY      spinal fusion-lower back    TONSILLECTOMY         FAMILY HISTORY:  family history includes Heart Disease in his brother, brother, brother, father, mother, and sister; High Blood Pressure in his father and mother. SOCIAL HISTORY:   reports that he quit smoking about 37 years ago. He smoked 3.50 packs per day.  He has never used smokeless tobacco.      ALLERGIES:  Patient is allergic to other; codeine; and vicodin [hydrocodone-acetaminophen]. REVIEW OF SYSTEMS:  Constitutional: Negative for fever, positive for weight gain  HENT: Negative for sore throat  Eyes: Negative for redness   Respiratory: Positive for dyspnea, negative for cough  Cardiovascular: Negative for chest pain  Gastrointestinal: Negative for vomiting, diarrhea   Genitourinary: Negative for hematuria   Musculoskeletal: Positive for arthralgias, back pain  Skin: Negative for rash  Neurological: Negative for syncope  Hematological: Negative for adenopathy  Psychiatric/Behavorial: Negative for anxiety    Objective:   PHYSICAL EXAM:  There were no vitals taken for this visit. This was a limited physical exam as this was a video virtual visit through [employer]. He was very pleasant, awake, alert and oriented. He could speak in full sentences without visible cough or shortness of breath. Eye exam was normal, he was wearing glasses. He also had a class II airway with upper denture, missing lower denture. He had a short and large neck. Neck exam showed no JVD. Current Outpatient Medications   Medication Sig Dispense Refill    famotidine (PEPCID) 20 MG tablet Take 20 mg by mouth daily      Glucosamine-Chondroit-Vit C-Mn (GLUCOSAMINE CHONDR 500 COMPLEX PO) Take by mouth      atenolol (TENORMIN) 50 MG tablet Take 1 tablet by mouth daily 90 tablet 3    ibuprofen (ADVIL;MOTRIN) 800 MG tablet Take 800 mg by mouth nightly      terazosin (HYTRIN) 2 MG capsule Take 2 mg by mouth nightly      vitamin D (CHOLECALCIFEROL) 1000 UNIT TABS tablet Take 1,000 Units by mouth 2 times daily      sildenafil (REVATIO) 20 MG tablet Take 20 mg by mouth as needed      atorvastatin (LIPITOR) 40 MG tablet Take 1 tablet by mouth daily. (Patient taking differently: Take 40 mg by mouth nightly ) 30 tablet 0    aspirin 81 MG tablet Take 81 mg by mouth daily.  nitroGLYCERIN (NITROSTAT) 0.4 MG SL tablet Place 1 tablet under the tongue every 5 minutes as needed.  25 tablet 1    clopidogrel (PLAVIX) 75 MG tablet Take 1 tablet by mouth daily. 90 tablet 3    Multiple Vitamins-Minerals (MULTI COMPLETE PO) Take 1 tablet by mouth daily.  diazepam (VALIUM) 10 MG tablet Take 10 mg by mouth nightly. No current facility-administered medications for this visit. Data Reviewed:   CBC and Renal reviewed  Last CBC  Lab Results   Component Value Date    WBC 5.3 01/17/2015    RBC 4.29 01/17/2015    HGB 14.0 01/17/2015    MCV 94.5 01/17/2015     01/17/2015     Last Renal  Lab Results   Component Value Date     01/17/2015    K 4.0 01/17/2015     01/17/2015    CO2 25 01/17/2015    CO2 30 01/16/2015    CO2 28 01/15/2015    BUN 23 01/17/2015    CREATININE 1.2 01/17/2015    GLUCOSE 101 01/17/2015    CALCIUM 9.2 01/17/2015       Chest imaging reports were reviewed, images from 2017 reviewed    The patient has been getting his lab work in the [place] system    Assessment:     · MARIA ISABEL  · Essential hypertension  · Ischemic cardiomyopathy    Plan:      1. Obstructive sleep apnea  Symptoms suggestive of MARIA ISABEL, he does have some daytime sleepiness. Will obtain a home sleep study  - Home Sleep Study; Future    2. Essential hypertension  Blood pressure control explained to him that he should evaluate his could improve if he is treated for MARIA ISABEL    3. Ischemic cardiomyopathy  Sleep apnea due to underlying ischemic cardiomyopathy and potential for CHF    4. Prophylaxis  The patient gets his immunizations through the [place], thinks he is up-to-date    Marichuy Ritter is a 65 y.o. male being evaluated by a Virtual Visit (video visit) encounter to address concerns as mentioned above. A caregiver was present when appropriate. Due to this being a TeleHealth encounter (During MPZEF-66 public health emergency), evaluation of the following organ systems was limited: Vitals/Constitutional/EENT/Resp/CV/GI//MS/Neuro/Skin/Heme-Lymph-Imm.   Pursuant to the emergency declaration under the [facility] and the National Emergencies Act, [facility] waiver authority and the [employer] Act, this Virtual Visit was conducted with patient's (and/or legal guardian's) consent, to reduce the patient's risk of exposure to COVID-19 and provide necessary medical care. The patient (and/or legal guardian) has also been advised to contact this office for worsening conditions or problems, and seek emergency medical treatment and/or call 911 if deemed necessary. Patient identification was verified at the start of the visit: Yes    Total time spent for this encounter: Not billed by time    Services were provided through a video synchronous discussion virtually to substitute for in-person clinic visit. Patient and provider were located at their individual homes. --Jovon Gudino MD on 7/9/2020 at 8:18 PM    An electronic signature was used to authenticate this note.

## 2020-07-08 NOTE — PATIENT INSTRUCTIONS
Remember to bring all pulmonary medications to your next appointment with the office. Please keep all of your future appointments scheduled by [facility] Pulmonary office. Out of respect for other patients and providers, you may be asked to reschedule your appointment if you arrive later than your scheduled appointment time. Appointments cancelled less than 24hrs in advance will be considered a no show. Patients with three missed appointments within 1 year or four missed appointments within 2 years can be dismissed from the practice. You may receive a survey regarding the care you received during your visit. Your input is valuable to us. We encourage you to complete and return your survey. We hope you will choose us in the future for your healthcare needs.

## 2020-07-14 ENCOUNTER — HOSPITAL ENCOUNTER (OUTPATIENT)
Dept: SLEEP CENTER | Age: 70
Discharge: HOME | End: 2020-07-16
Payer: MEDICARE

## 2020-07-14 PROCEDURE — 95806 SLEEP STUDY UNATT&RESP EFFT: CPT

## 2020-07-24 ENCOUNTER — VIRTUAL VISIT (OUTPATIENT)
Dept: PULMONOLOGY | Age: 70
End: 2020-07-24
Payer: MEDICARE

## 2020-07-24 PROCEDURE — 1123F ACP DISCUSS/DSCN MKR DOCD: CPT | Performed by: INTERNAL MEDICINE

## 2020-07-24 PROCEDURE — 1036F TOBACCO NON-USER: CPT | Performed by: INTERNAL MEDICINE

## 2020-07-24 PROCEDURE — 4040F PNEUMOC VAC/ADMIN/RCVD: CPT | Performed by: INTERNAL MEDICINE

## 2020-07-24 PROCEDURE — G8417 CALC BMI ABV UP PARAM F/U: HCPCS | Performed by: INTERNAL MEDICINE

## 2020-07-24 PROCEDURE — G8427 DOCREV CUR MEDS BY ELIG CLIN: HCPCS | Performed by: INTERNAL MEDICINE

## 2020-07-24 PROCEDURE — 99213 OFFICE O/P EST LOW 20 MIN: CPT | Performed by: INTERNAL MEDICINE

## 2020-07-24 PROCEDURE — 3017F COLORECTAL CA SCREEN DOC REV: CPT | Performed by: INTERNAL MEDICINE

## 2020-07-24 ASSESSMENT — SLEEP AND FATIGUE QUESTIONNAIRES
HOW LIKELY ARE YOU TO NOD OFF OR FALL ASLEEP WHILE WATCHING TV: 0
HOW LIKELY ARE YOU TO NOD OFF OR FALL ASLEEP WHILE SITTING AND TALKING TO SOMEONE: 0
HOW LIKELY ARE YOU TO NOD OFF OR FALL ASLEEP WHEN YOU ARE A PASSENGER IN A CAR FOR AN HOUR WITHOUT A BREAK: 0
HOW LIKELY ARE YOU TO NOD OFF OR FALL ASLEEP IN A CAR, WHILE STOPPED FOR A FEW MINUTES IN TRAFFIC: 0
HOW LIKELY ARE YOU TO NOD OFF OR FALL ASLEEP WHILE SITTING INACTIVE IN A PUBLIC PLACE: 0
HOW LIKELY ARE YOU TO NOD OFF OR FALL ASLEEP WHILE SITTING AND READING: 0
HOW LIKELY ARE YOU TO NOD OFF OR FALL ASLEEP WHILE LYING DOWN TO REST IN THE AFTERNOON WHEN CIRCUMSTANCES PERMIT: 3
HOW LIKELY ARE YOU TO NOD OFF OR FALL ASLEEP WHILE SITTING QUIETLY AFTER LUNCH WITHOUT ALCOHOL: 0
ESS TOTAL SCORE: 3

## 2020-07-24 NOTE — PROGRESS NOTES
REASON FOR FOLLOW UP: MARIA ISABEL, hypersomnia    PCP: Virgilio Su MD    HISTORY OF PRESENT ILLNESS: Miguelito Lee is a 65y.o. year old male with a history of hypertension, hyperlipidemia, DJD, BPH and CAD who presents for follow-up of MARIA ISABEL and hypersomnia. He was referred by Dr. Eufemia Rowe. Mr. Татьяна Mota is a 70-year-old male with a history of hypertension, hyperlipidemia, cardiomyopathy who was evaluated by virtual visit on 7/8/2020 for possible MARIA ISABEL. He returns today to discuss results of his sleep study. The patient was seen by virtual visit 7/8/20, had a home sleep study on 7/14/2020. Her blood pressure has decreased from 599 systolic 498 systolic after increasing her atenolol. He did use Valium during his home sleep study. Home sleep study 7/14/2020  Mild sleep apnea with AHI 13.2/h: 14 apneas, 80 hypopneas. Low oxygen saturation was 76%, 17.6 minutes were spent with SaO2 <89%. Previous notes reviewed and edited as necessary. Gordon Holland is a very pleasant 70-year-old male living with his wife in [facility]. He gets his primary care from Dr. Ahsan Berg in the [facility] system and Dr. Sanjiv Avendano at the [facility] in [facility]. The patient was in the [facility] for 18 months, was deployed to [facility].  He was working as a , 35 years ago joined by his company, rising up [email or web address] and retiring as a  in 2009. He then quit for a few years, 3 years ago joined as a  in the [employer]. The patient is seen by Dr. Jareth Mckeon, at follow-up he complained of excessive sleepiness and difficulty with sleep, hence the consultation. The patient says since leaving the [facility], he has needed Valium as he wakes up with dreams. He only uses Valium to go back to sleep if he wakes up. He wakes up several times gasping for breath. He goes to bed around 10 or 11 PM, usually reads in bed. He has no difficulty falling asleep.   He wakes up 1-3 times a night to go to the bathroom, or due to his back hurting him. He is out of bed around 6:10 AM when he is working, now wakes up around 7 AM.  He is usually refreshed in the morning. He works, he comes home and takes a nap for 20 to 30 minutes in the afternoon. He snores while he is on his back but rarely sleeps on his back due to back pain. He usually sleeps on his left side. He used to wake up with severe cramps, but was taught some exercises that have helped. He and his wife recently obtained a sleep number bed, sleep has been better. He does keep the bedroom quiet and cool at 60 °F.  He does have a boxer that snores, but does not sleep in the bed. He used to talk in his sleep, not since 1979. He does not wake up with a dry mouth. He has a good appetite, denies GI or  complaints. He denies lower extremity edema. He did gain about 20 pounds, has lost 3 pounds recently. He tries to walk 1 mile and a night. I have personally reviewed the patient's past medical, surgical, family and personal history. Changes were documented as needed. I have personally reviewed available radiology images.     Salix Sleepiness Scale:    Sleep Medicine 7/8/2020   Sitting and reading 0   Watching TV 0   Sitting, inactive in a public place (e.g. a theatre or a meeting) 0   As a passenger in a car for an hour without a break 0   Lying down to rest in the afternoon when circumstances permit 3   Sitting and talking to someone 0   Sitting quietly after a lunch without alcohol 0   In a car, while stopped for a few minutes in traffic 0   Total score 3       PAST MEDICAL HISTORY:  Past Medical History:   Diagnosis Date    BPH (benign prostatic hyperplasia)     CAD (coronary artery disease)     Chronic back pain     Gastric ulcer     past hx    GERD (gastroesophageal reflux disease)     Hyperlipidemia     Hypertension     [facility]    Osteoarthritis     Sciatic pain        PAST SURGICAL HISTORY:  Past Surgical History:   Procedure Laterality Date    CORONARY ANGIOPLASTY WITH STENT PLACEMENT      Total of 14 stents [id number]    CORONARY ARTERY BYPASS GRAFT  2012    INTRACAPSULAR CATARACT EXTRACTION Right 10/11/2019    PHACOEMULSIFICATION OF CATARACT RIGHT EYE WITH INTRAOCULAR LENS IMPLANT performed by Jennifer Conte MD at [facility] Left 10/25/2019    PHACOEMULSIFICATION OF CATARACT LEFT EYE WITH INTRAOCULAR LENS IMPLANT performed by Jennifer Conte MD at [place] [address] ARTHROSCOPY  05/2010    SPINE SURGERY      spinal fusion-lower back    TONSILLECTOMY         FAMILY HISTORY:  family history includes Heart Disease in his brother, brother, brother, father, mother, and sister; High Blood Pressure in his father and mother. SOCIAL HISTORY:   reports that he quit smoking about 37 years ago. He smoked 3.50 packs per day. He has never used smokeless tobacco.      ALLERGIES:  Patient is allergic to other; codeine; and vicodin [hydrocodone-acetaminophen]. REVIEW OF SYSTEMS:  Constitutional: Negative for fever, positive for weight gain  HENT: Negative for sore throat  Eyes: Negative for redness   Respiratory: Positive for dyspnea, negative for cough  Cardiovascular: Negative for chest pain  Gastrointestinal: Negative for vomiting, diarrhea   Genitourinary: Negative for hematuria   Musculoskeletal: Positive for arthralgias, back pain  Skin: Negative for rash  Neurological: Negative for syncope  Hematological: Negative for adenopathy  Psychiatric/Behavorial: Negative for anxiety    Objective:   PHYSICAL EXAM:  There were no vitals taken for this visit. This was a limited physical exam as this was a video virtual visit through [employer]. He was very pleasant, awake, alert and oriented. He could speak in full sentences without visible cough or shortness of breath. Eye exam was normal, he was wearing glasses.  He also had a class II airway with upper denture, missing lower denture. He had a short and large neck. Neck exam showed no JVD. Current Outpatient Medications   Medication Sig Dispense Refill    famotidine (PEPCID) 20 MG tablet Take 20 mg by mouth daily      Glucosamine-Chondroit-Vit C-Mn (GLUCOSAMINE CHONDR 500 COMPLEX PO) Take by mouth      atenolol (TENORMIN) 50 MG tablet Take 1 tablet by mouth daily 90 tablet 3    ibuprofen (ADVIL;MOTRIN) 800 MG tablet Take 800 mg by mouth nightly      terazosin (HYTRIN) 2 MG capsule Take 2 mg by mouth nightly      vitamin D (CHOLECALCIFEROL) 1000 UNIT TABS tablet Take 1,000 Units by mouth 2 times daily      sildenafil (REVATIO) 20 MG tablet Take 20 mg by mouth as needed      atorvastatin (LIPITOR) 40 MG tablet Take 1 tablet by mouth daily. (Patient taking differently: Take 40 mg by mouth nightly ) 30 tablet 0    aspirin 81 MG tablet Take 81 mg by mouth daily.  nitroGLYCERIN (NITROSTAT) 0.4 MG SL tablet Place 1 tablet under the tongue every 5 minutes as needed. 25 tablet 1    clopidogrel (PLAVIX) 75 MG tablet Take 1 tablet by mouth daily. 90 tablet 3    Multiple Vitamins-Minerals (MULTI COMPLETE PO) Take 1 tablet by mouth daily.  diazepam (VALIUM) 10 MG tablet Take 10 mg by mouth nightly. No current facility-administered medications for this visit.       Data Reviewed:   CBC and Renal reviewed  Last CBC  Lab Results   Component Value Date    WBC 5.3 01/17/2015    RBC 4.29 01/17/2015    HGB 14.0 01/17/2015    MCV 94.5 01/17/2015     01/17/2015     Last Renal  Lab Results   Component Value Date     01/17/2015    K 4.0 01/17/2015     01/17/2015    CO2 25 01/17/2015    CO2 30 01/16/2015    CO2 28 01/15/2015    BUN 23 01/17/2015    CREATININE 1.2 01/17/2015    GLUCOSE 101 01/17/2015    CALCIUM 9.2 01/17/2015       Chest imaging reports were reviewed, images from 2017 reviewed    The patient has been getting his lab work in the [place] system    Assessment:     · MARIA ISABEL  · Essential hypertension  · Ischemic cardiomyopathy    Plan:      1. Obstructive sleep apnea  Sleep study results were discussed with him. I have recommended treatment. He will check with his VA benefits as to whether he would be able to get a referral to Dr. Patrizia Finch for a mandibular advancement device. If not, he will likely get his CPAP paid for by the [place]. He will get back to us when he has information. 2. Essential hypertension  Blood pressure controlled, explained to him that he should evaluate his could improve if he is treated for MARIA ISABEL    3. Ischemic cardiomyopathy  Important to treat sleep apnea due to underlying ischemic cardiomyopathy and potential for CHF    4. Prophylaxis  The patient gets his immunizations through the [place], thinks he is up-to-date    Tiara Massey is a 65 y.o. male being evaluated by a Virtual Visit (video visit) encounter to address concerns as mentioned above. A caregiver was present when appropriate. Due to this being a TeleHealth encounter (During GCXWE-84 public health emergency), evaluation of the following organ systems was limited: Vitals/Constitutional/EENT/Resp/CV/GI//MS/Neuro/Skin/Heme-Lymph-Imm. Pursuant to the emergency declaration under the [facility], [address] [place] [address] authority and the [employer] and [employer] Act, this Virtual Visit was conducted with patient's (and/or legal guardian's) consent, to reduce the patient's risk of exposure to COVID-19 and provide necessary medical care. The patient (and/or legal guardian) has also been advised to contact this office for worsening conditions or problems, and seek emergency medical treatment and/or call 911 if deemed necessary.      Patient identification was verified at the start of the visit: Yes    Total time spent for this encounter: 16  minutes    Services were provided through a video synchronous discussion virtually to substitute for in-person clinic

## 2020-07-24 NOTE — LETTER
[id number] [facility] Sleep  [address] [place] [address] Tayla Garg [address]  Phone: [phone]  Fax: [phone]               7/26/20           Patient:  Tiara Massey         YOB: 1950                       Dear Chaz Chavez MD            Tiara Massey was seen in a follow up visit today. Below are the relevant portions        of my assessment and plan of care. If you have questions, please do not hesitate to        call me. I look forward to following Tiara Massey along with you.            Sincerely             Anselmo Maynard MD

## 2021-05-13 ENCOUNTER — HOSPITAL ENCOUNTER (EMERGENCY)
Age: 71
Discharge: HOME | End: 2021-05-13
Payer: MEDICARE

## 2021-05-13 ENCOUNTER — APPOINTMENT (OUTPATIENT)
Dept: CT IMAGING | Age: 71
End: 2021-05-13
Payer: MEDICARE

## 2021-05-13 ENCOUNTER — APPOINTMENT (OUTPATIENT)
Dept: GENERAL RADIOLOGY | Age: 71
End: 2021-05-13
Payer: MEDICARE

## 2021-05-13 VITALS
WEIGHT: 180 LBS | RESPIRATION RATE: 15 BRPM | DIASTOLIC BLOOD PRESSURE: 65 MMHG | BODY MASS INDEX: 28.25 KG/M2 | HEART RATE: 60 BPM | SYSTOLIC BLOOD PRESSURE: 142 MMHG | TEMPERATURE: 97.7 F | OXYGEN SATURATION: 95 % | HEIGHT: 67 IN

## 2021-05-13 DIAGNOSIS — K59.00 CONSTIPATION, UNSPECIFIED CONSTIPATION TYPE: Primary | ICD-10-CM

## 2021-05-13 DIAGNOSIS — R33.9 URINARY RETENTION: ICD-10-CM

## 2021-05-13 LAB
A/G RATIO: 1.7 (ref 1.1–2.2)
ALBUMIN SERPL-MCNC: 4 G/DL (ref 3.4–5)
ALP BLD-CCNC: 47 U/L (ref 40–129)
ALT SERPL-CCNC: 25 U/L (ref 10–40)
ANION GAP SERPL CALCULATED.3IONS-SCNC: 8 MMOL/L (ref 3–16)
AST SERPL-CCNC: 22 U/L (ref 15–37)
BASOPHILS ABSOLUTE: 0 K/UL (ref 0–0.2)
BASOPHILS RELATIVE PERCENT: 0.4 %
BILIRUB SERPL-MCNC: 0.8 MG/DL (ref 0–1)
BILIRUBIN URINE: NEGATIVE
BLOOD, URINE: NEGATIVE
BUN BLDV-MCNC: 30 MG/DL (ref 7–20)
CALCIUM SERPL-MCNC: 9.7 MG/DL (ref 8.3–10.6)
CHLORIDE BLD-SCNC: 104 MMOL/L (ref 99–110)
CLARITY: CLEAR
CO2: 23 MMOL/L (ref 21–32)
COLOR: YELLOW
CREAT SERPL-MCNC: 1.3 MG/DL (ref 0.8–1.3)
EOSINOPHILS ABSOLUTE: 0.1 K/UL (ref 0–0.6)
EOSINOPHILS RELATIVE PERCENT: 0.5 %
GFR AFRICAN AMERICAN: >60
GFR NON-AFRICAN AMERICAN: 54
GLOBULIN: 2.4 G/DL
GLUCOSE BLD-MCNC: 116 MG/DL (ref 70–99)
GLUCOSE URINE: NEGATIVE MG/DL
HCT VFR BLD CALC: 38.7 % (ref 40.5–52.5)
HEMOGLOBIN: 13.5 G/DL (ref 13.5–17.5)
KETONES, URINE: NEGATIVE MG/DL
LEUKOCYTE ESTERASE, URINE: NEGATIVE
LYMPHOCYTES ABSOLUTE: 0.9 K/UL (ref 1–5.1)
LYMPHOCYTES RELATIVE PERCENT: 9.3 %
MCH RBC QN AUTO: 33.2 PG (ref 26–34)
MCHC RBC AUTO-ENTMCNC: 34.9 G/DL (ref 31–36)
MCV RBC AUTO: 95.2 FL (ref 80–100)
MICROSCOPIC EXAMINATION: NORMAL
MONOCYTES ABSOLUTE: 0.9 K/UL (ref 0–1.3)
MONOCYTES RELATIVE PERCENT: 9.1 %
NEUTROPHILS ABSOLUTE: 7.5 K/UL (ref 1.7–7.7)
NEUTROPHILS RELATIVE PERCENT: 80.7 %
NITRITE, URINE: NEGATIVE
PDW BLD-RTO: 13.5 % (ref 12.4–15.4)
PH UA: 5.5 (ref 5–8)
PLATELET # BLD: 150 K/UL (ref 135–450)
PMV BLD AUTO: 7.5 FL (ref 5–10.5)
POTASSIUM REFLEX MAGNESIUM: 4.8 MMOL/L (ref 3.5–5.1)
PROTEIN UA: NEGATIVE MG/DL
RBC # BLD: 4.06 M/UL (ref 4.2–5.9)
SODIUM BLD-SCNC: 135 MMOL/L (ref 136–145)
SPECIFIC GRAVITY UA: 1.02 (ref 1–1.03)
TOTAL PROTEIN: 6.4 G/DL (ref 6.4–8.2)
URINE REFLEX TO CULTURE: NORMAL
URINE TYPE: NORMAL
UROBILINOGEN, URINE: 0.2 E.U./DL
WBC # BLD: 9.3 K/UL (ref 4–11)

## 2021-05-13 PROCEDURE — 80053 COMPREHEN METABOLIC PANEL: CPT

## 2021-05-13 PROCEDURE — 85025 COMPLETE CBC W/AUTO DIFF WBC: CPT

## 2021-05-13 PROCEDURE — 74177 CT ABD & PELVIS W/CONTRAST: CPT

## 2021-05-13 PROCEDURE — 74018 RADEX ABDOMEN 1 VIEW: CPT

## 2021-05-13 PROCEDURE — 81003 URINALYSIS AUTO W/O SCOPE: CPT

## 2021-05-13 PROCEDURE — 6360000002 HC RX W HCPCS: Performed by: NURSE PRACTITIONER

## 2021-05-13 PROCEDURE — 99285 EMERGENCY DEPT VISIT HI MDM: CPT

## 2021-05-13 PROCEDURE — 6370000000 HC RX 637 (ALT 250 FOR IP): Performed by: NURSE PRACTITIONER

## 2021-05-13 PROCEDURE — 96375 TX/PRO/DX INJ NEW DRUG ADDON: CPT

## 2021-05-13 PROCEDURE — 96374 THER/PROPH/DIAG INJ IV PUSH: CPT

## 2021-05-13 PROCEDURE — 6360000004 HC RX CONTRAST MEDICATION: Performed by: NURSE PRACTITIONER

## 2021-05-13 RX ORDER — DOCUSATE SODIUM 100 MG/1
100 CAPSULE, LIQUID FILLED ORAL 2 TIMES DAILY
Qty: 60 CAPSULE | Refills: 0 | Status: SHIPPED | OUTPATIENT
Start: 2021-05-13 | End: 2021-06-12

## 2021-05-13 RX ORDER — MORPHINE SULFATE 4 MG/ML
4 INJECTION, SOLUTION INTRAMUSCULAR; INTRAVENOUS ONCE
Status: COMPLETED | OUTPATIENT
Start: 2021-05-13 | End: 2021-05-13

## 2021-05-13 RX ORDER — ONDANSETRON 2 MG/ML
4 INJECTION INTRAMUSCULAR; INTRAVENOUS ONCE
Status: COMPLETED | OUTPATIENT
Start: 2021-05-13 | End: 2021-05-13

## 2021-05-13 RX ADMIN — ONDANSETRON 4 MG: 2 INJECTION INTRAMUSCULAR; INTRAVENOUS at 13:47

## 2021-05-13 RX ADMIN — IOPAMIDOL 75 ML: 755 INJECTION, SOLUTION INTRAVENOUS at 16:23

## 2021-05-13 RX ADMIN — MORPHINE SULFATE 4 MG: 4 INJECTION, SOLUTION INTRAMUSCULAR; INTRAVENOUS at 13:47

## 2021-05-13 RX ADMIN — MAGNESIUM CITRATE 296 ML: 1.75 LIQUID ORAL at 17:54

## 2021-05-13 ASSESSMENT — PAIN DESCRIPTION - LOCATION
LOCATION: ABDOMEN
LOCATION: ABDOMEN

## 2021-05-13 ASSESSMENT — ENCOUNTER SYMPTOMS
ABDOMINAL PAIN: 1
WHEEZING: 0
DIARRHEA: 0
COLOR CHANGE: 0
SHORTNESS OF BREATH: 0
VOMITING: 0
COUGH: 0
BACK PAIN: 0
NAUSEA: 0
CONSTIPATION: 1

## 2021-05-13 ASSESSMENT — PAIN SCALES - GENERAL
PAINLEVEL_OUTOF10: 3
PAINLEVEL_OUTOF10: 8
PAINLEVEL_OUTOF10: 8

## 2021-05-13 NOTE — ED NOTES
--Patient provided with discharge instructions, 1 prescription. --Instructions, and follow-up appointments reviewed with patient/family. No further questions or needs at this time. --Vital signs and patient stable upon discharge. --Patient ambulatory to [facility].         Matthew Das RN  05/13/21 3076

## 2021-05-13 NOTE — ED NOTES
Catheter drainage bag changed to leg bag at this time. Instructed patient and wife on interchanging the leg bag with the standard drainage bag. Wife and patient verbalized understanding. Patient sent home with new drainage bag.       Vinny Banks RN  05/13/21 5167

## 2021-05-13 NOTE — ED PROVIDER NOTES
GERD (gastroesophageal reflux disease)     Hyperlipidemia     Hypertension     [facility] ([facility] 06/1999    Osteoarthritis     Sciatic pain          Procedure Laterality Date    CORONARY ANGIOPLASTY WITH STENT PLACEMENT      Total of 14 stents [phone]    CORONARY ARTERY BYPASS GRAFT  2012    INTRACAPSULAR CATARACT EXTRACTION Right 10/11/2019    PHACOEMULSIFICATION OF CATARACT RIGHT EYE WITH INTRAOCULAR LENS IMPLANT performed by Gordon [facility] MD at [facility] Left 10/25/2019    PHACOEMULSIFICATION OF CATARACT LEFT EYE WITH INTRAOCULAR LENS IMPLANT performed by Gordon Lee MD at [place] [address] ARTHROSCOPY  05/2010    SPINE SURGERY      spinal fusion-lower back    TONSILLECTOMY         Medications:  Discharge Medication List as of 5/13/2021  6:09 PM      CONTINUE these medications which have NOT CHANGED    Details   famotidine (PEPCID) 20 MG tablet Take 20 mg by mouth dailyHistorical Med      Glucosamine-Chondroit-Vit C-Mn (GLUCOSAMINE CHONDR 500 COMPLEX PO) Take by mouthHistorical Med      atenolol (TENORMIN) 50 MG tablet Take 1 tablet by mouth daily, Disp-90 tablet, R-3Normal      ibuprofen (ADVIL;MOTRIN) 800 MG tablet Take 800 mg by mouth nightlyHistorical Med      terazosin (HYTRIN) 2 MG capsule Take 2 mg by mouth nightlyHistorical Med      vitamin D (CHOLECALCIFEROL) 1000 UNIT TABS tablet Take 1,000 Units by mouth 2 times dailyHistorical Med      sildenafil (REVATIO) 20 MG tablet Take 20 mg by mouth as needed      atorvastatin (LIPITOR) 40 MG tablet Take 1 tablet by mouth daily. , Disp-30 tablet, R-0      aspirin 81 MG tablet Take 81 mg by mouth daily. nitroGLYCERIN (NITROSTAT) 0.4 MG SL tablet Place 1 tablet under the tongue every 5 minutes as needed. , Disp-25 tablet, R-1      clopidogrel (PLAVIX) 75 MG tablet Take 1 tablet by mouth daily. , Disp-90 tablet, R-3      Multiple Vitamins-Minerals (MULTI COMPLETE PO) Take 1 tablet by mouth daily. diazepam (VALIUM) 10 MG tablet Take 10 mg by mouth nightly. Historical Med               Review of Systems:  Review of Systems   Constitutional: Negative for chills and fever. HENT: Negative for congestion. Respiratory: Negative for cough, shortness of breath and wheezing. Cardiovascular: Negative for chest pain. Gastrointestinal: Positive for abdominal pain and constipation. Negative for diarrhea, nausea and vomiting. Complains of diffuse abdominal cramping, feelings of constipation. He has had some nausea but no vomiting, he did have diarrhea last week while he was in [place], took some Imodium there and improved it however, he has not had a normal bowel movement since Sunday. Genitourinary: Positive for difficulty urinating. Negative for dysuria, frequency and hematuria. In addition to the constipation he is now had difficulty urinating, has not been able to urinate completely relieving his bladder since last night. He denies any urinary symptoms besides inability to relieve his bladder, he does have a history of enlarged prostate. Musculoskeletal: Negative for back pain. Skin: Negative for color change. Neurological: Negative for weakness, numbness and headaches. Positives and Pertinent negatives as per HPI. Except as noted above in the ROS, problem specific ROS was completed and is negative. Physical Exam:  Physical Exam  Vitals signs and nursing note reviewed. Constitutional:       Appearance: He is well-developed. He is not diaphoretic. HENT:      Head: Normocephalic. Right Ear: External ear normal.      Left Ear: External ear normal.   Eyes:      General: No scleral icterus. Right eye: No discharge. Left eye: No discharge. Neck:      Musculoskeletal: Normal range of motion and neck supple. Cardiovascular:      Rate and Rhythm: Normal rate.    Pulmonary:      Effort: Pulmonary effort is normal. No respiratory distress. Breath sounds: Normal breath sounds. Abdominal:      Palpations: Abdomen is soft. Tenderness: There is abdominal tenderness. Comments: Abdomen is soft and nondistended. Bowel sounds are hypoactive, however he has tenderness in the suprapubic region of the abdomen. No ascites or rigidity. No rebound tenderness or McBurney's point. Musculoskeletal: Normal range of motion. Skin:     General: Skin is warm. Capillary Refill: Capillary refill takes less than 2 seconds. Coloration: Skin is not pale. Neurological:      General: No focal deficit present. Mental Status: He is alert and oriented to person, place, and time. GCS: GCS eye subscore is 4. GCS verbal subscore is 5. GCS motor subscore is 6.    Psychiatric:         Behavior: Behavior normal.         MEDICAL DECISION MAKING    Vitals:    Vitals:    05/13/21 1340 05/13/21 1510 05/13/21 1640 05/13/21 1745   BP: 133/81 121/64 (!) 151/72 (!) 142/65   Pulse: 63 62 67 60   Resp: 15 16  15   Temp:       TempSrc:       SpO2: 98% 98% 92% 95%   Weight:       Height:           LABS:  Labs Reviewed   CBC WITH AUTO DIFFERENTIAL - Abnormal; Notable for the following components:       Result Value    RBC 4.06 (*)     Hematocrit 38.7 (*)     Lymphocytes Absolute 0.9 (*)     All other components within normal limits    Narrative:     Performed at:  [address],  [address] [place] [address], [facility] [employer]   Phone [phone]   COMPREHENSIVE METABOLIC PANEL W/ REFLEX TO MG FOR LOW K - Abnormal; Notable for the following components:    Sodium 135 (*)     Glucose 116 (*)     BUN 30 (*)     GFR Non- 54 (*)     All other components within normal limits    Narrative:     Performed at:  [address],  [address] [place] [address], [facility] [employer]   Phone [phone]   URINE RT REFLEX TO CULTURE    Narrative:     Performed at:  [address] [place] [address] Laboratory  [address] [place] [address], [address] [place] [address]   Phone  of labs reviewed and werenegative at this time or not returned at the time of this note. RADIOLOGY:   Non-plain film images such as CT, Ultrasound and MRI are read by the radiologist. Kelley ALVARADO APRN - CNP have directly visualized the radiologic plain film image(s) with the below findings:    Interpretation per the Radiologist below, if available at the time of this note:    CT ABDOMEN PELVIS W IV CONTRAST Additional Contrast? None   Final Result   Moderate rectal stool. Sigmoid diverticulosis, without evidence of acute   diverticulitis. Cholelithiasis. Decompressed urinary bladder with a Fitch catheter in place. Otherwise, unremarkable contrast-enhanced CT examination of the abdomen and   pelvis, without acute finding to account for the patient's symptoms. XR ABDOMEN (KUB) (SINGLE AP VIEW)   Final Result   Mass effect in the pelvis may be related to a distended bladder. Bowel gas   pattern is nonspecific. MEDICAL DECISION MAKING / ED COURSE:    Because of high probability of sudden clinical deterioration of the patient's condition and risk of further deterioration, critical care time required my full attention to the patient's condition; which included chart data review, documentation, medication ordering, reviewing the patient's old records, reevaluation patient's cardiac, pulmonary and neurological status. Reevaluation of vital signs. Consultations with ED attending and admitting physician. Ordering, interpreting reviewing diagnostic testing. Therefore a critical care time was 18 minutes of direct attention to the patient's condition did not include time spent on procedures.     PROCEDURES:   Procedures    None    Patient was given:  Medications   morphine (PF) injection 4 mg (4 mg Intravenous Given 5/13/21 6906)   ondansetron (ZOFRAN) injection 4 mg (4 mg Intravenous between the patient and myself and we agreed that the patient could be discharged home with outpatient follow-up. Patient was discharged home with referral back to PCP, he was given referral to urology, educated to call first thing in the morning. Educated to keep the Fitch catheter in place, start Colace as prescribed. The patient tolerated their visit well. I evaluated the patient. The physician was available for consultation as needed. The patient and / or the family were informed of the results of any tests, a time was given to answer questions, a plan was proposed and they agreed with plan. Patient verbalized understanding of discharge instructions and was discharged from the department in stable condition. CLINICAL IMPRESSION:  1. Constipation, unspecified constipation type    2.  Urinary retention        DISPOSITION Decision To Discharge 05/13/2021 05:37:43 PM      PATIENT REFERRED TO:  [facility]   [phone]  Schedule an appointment as soon as possible for a visit in 1 day  This is a urology referral, follow-up in 2 days for reevaluation    [facility]  ED  [facility] [address]  [phone]  Go to   If symptoms worsen      Follow-up with your family doctor in 2 days for reevaluation of your constipation          DISCHARGE MEDICATIONS:  Discharge Medication List as of 5/13/2021  6:09 PM      START taking these medications    Details   docusate sodium (COLACE) 100 MG capsule Take 1 capsule by mouth 2 times daily, Disp-60 capsule, R-0Print             DISCONTINUED MEDICATIONS:  Discharge Medication List as of 5/13/2021  6:09 PM                 (Please note the MDM and HPI sections of this note were completed with a voice recognition program.  Efforts were made to edit the dictations but occasionally words are mis-transcribed.)    Electronically signed, JEFF Mckeon CNP, Macie E

## 2021-06-16 ENCOUNTER — OFFICE VISIT (OUTPATIENT)
Dept: CARDIOLOGY CLINIC | Age: 71
End: 2021-06-16
Payer: MEDICARE

## 2021-06-16 VITALS
HEIGHT: 66 IN | HEART RATE: 51 BPM | OXYGEN SATURATION: 97 % | SYSTOLIC BLOOD PRESSURE: 142 MMHG | BODY MASS INDEX: 30.53 KG/M2 | DIASTOLIC BLOOD PRESSURE: 60 MMHG | WEIGHT: 190 LBS

## 2021-06-16 DIAGNOSIS — Z95.1 S/P CABG X 1: ICD-10-CM

## 2021-06-16 DIAGNOSIS — I10 ESSENTIAL HYPERTENSION: ICD-10-CM

## 2021-06-16 DIAGNOSIS — I25.10 ATHEROSCLEROSIS OF NATIVE CORONARY ARTERY OF NATIVE HEART WITHOUT ANGINA PECTORIS: Primary | ICD-10-CM

## 2021-06-16 PROCEDURE — 1036F TOBACCO NON-USER: CPT | Performed by: INTERNAL MEDICINE

## 2021-06-16 PROCEDURE — G8417 CALC BMI ABV UP PARAM F/U: HCPCS | Performed by: INTERNAL MEDICINE

## 2021-06-16 PROCEDURE — G8427 DOCREV CUR MEDS BY ELIG CLIN: HCPCS | Performed by: INTERNAL MEDICINE

## 2021-06-16 PROCEDURE — 4040F PNEUMOC VAC/ADMIN/RCVD: CPT | Performed by: INTERNAL MEDICINE

## 2021-06-16 PROCEDURE — 99214 OFFICE O/P EST MOD 30 MIN: CPT | Performed by: INTERNAL MEDICINE

## 2021-06-16 PROCEDURE — 1123F ACP DISCUSS/DSCN MKR DOCD: CPT | Performed by: INTERNAL MEDICINE

## 2021-06-16 PROCEDURE — 3017F COLORECTAL CA SCREEN DOC REV: CPT | Performed by: INTERNAL MEDICINE

## 2021-06-16 RX ORDER — FINASTERIDE 5 MG/1
TABLET, FILM COATED ORAL
COMMUNITY
Start: 2020-08-04

## 2021-06-16 RX ORDER — FLUTICASONE PROPIONATE 50 MCG
SPRAY, SUSPENSION (ML) NASAL
COMMUNITY
Start: 2021-04-22

## 2021-06-16 NOTE — PROGRESS NOTES
[facility]   Cardiac Consultation    Referring Provider:  No primary care provider on file. Chief Complaint   Patient presents with    1 Year Follow Up    Coronary Artery Disease    Hypertension    Hyperlipidemia        History of Present Illness:   Natalie Reddy is a 66 y.o. male who is here today for follow up for a history of coronary artery disease- Multiple PCI of RCA in past including beta radiation. Recurrent restenosis RCA required 1V-CABG - SVG to PDA. PCI/AVA LAD 1/2015, hypertension, hyperlipidemia, cardiomyopathy. His most recent echocardiogram from 2/17/2020 showed an EF of 45% with mild mitral and aortic regurgitation. Today he states he has been feeling well overall. He is tolerating his medications and taking them as prescribed. He has not been checking his blood pressure at home. Patient currently denies any weight gain, edema, palpitations, chest pain, shortness of breath, dizziness, and syncope. Past Medical History:   has a past medical history of BPH (benign prostatic hyperplasia), CAD (coronary artery disease), Chronic back pain, Gastric ulcer, GERD (gastroesophageal reflux disease), Hyperlipidemia, Hypertension, MI (myocardial infarction) ([address] Osteoarthritis, and Sciatic pain. Surgical History:   has a past surgical history that includes Shoulder arthroscopy (05/2010); Coronary artery bypass graft (2012); Spine surgery; Coronary angioplasty with stent; Intracapsular cataract extraction (Right, 10/11/2019); Intracapsular cataract extraction (Left, 10/25/2019); and Tonsillectomy. Social History:   reports that he quit smoking about 38 years ago. He started smoking about 57 years ago. He smoked 3.50 packs per day. He has never used smokeless tobacco. He reports that he does not drink alcohol and does not use drugs.      Family History:  family history includes Heart Disease in his brother, brother, brother, father, mother, and sister; High Blood Pressure in his father and mother. Home Medications:  Prior to Admission medications    Medication Sig Start Date End Date Taking? Authorizing Provider   finasteride (PROSCAR) 5 MG tablet TAKE ONE TABLET BY MOUTH ONCE DAILY FOR PROSTATE (IF THIS MEDICATION IS CRUSHED PRIOR TO ADMINISTRATION, IT MUST NOT BE HANDLED BY WOMEN WHO ARE PREGNANT OR WHO MAY BECOME PREGNANT) 8/4/20  Yes Historical Provider, MD   fluticasone (FLONASE) 50 MCG/ACT nasal spray USE 2 SPRAYS IN EACH NOSTRIL ONCE DAILY FOR NASAL ALLERGY 4/22/21  Yes Historical Provider, MD   diclofenac sodium (VOLTAREN) 1 % GEL APPLY 4 GM (USE DOSING CARD) TOPICALLY FOUR TIMES A DAY AS NEEDED FOR SPINE AND RIGHT HIP JOINT PAIN (UPS GROUND) 4/22/21  Yes Historical Provider, MD   famotidine (PEPCID) 20 MG tablet Take 20 mg by mouth daily   Yes Historical Provider, MD   Glucosamine-Chondroit-Vit C-Mn (GLUCOSAMINE CHONDR 500 COMPLEX PO) Take by mouth   Yes Historical Provider, MD   atenolol (TENORMIN) 50 MG tablet Take 1 tablet by mouth daily 6/4/20  Yes Annika Queen MD   ibuprofen (ADVIL;MOTRIN) 800 MG tablet Take 800 mg by mouth nightly   Yes Historical Provider, MD   terazosin (HYTRIN) 2 MG capsule Take 5 mg by mouth nightly    Yes Historical Provider, MD   vitamin D (CHOLECALCIFEROL) 1000 UNIT TABS tablet Take 1,000 Units by mouth 2 times daily   Yes Historical Provider, MD   sildenafil (REVATIO) 20 MG tablet Take 20 mg by mouth as needed   Yes Historical Provider, MD   atorvastatin (LIPITOR) 40 MG tablet Take 1 tablet by mouth daily. Patient taking differently: Take 40 mg by mouth nightly  7/30/14  Yes Annika Queen MD   aspirin 81 MG tablet Take 81 mg by mouth daily. Yes Historical Provider, MD   nitroGLYCERIN (NITROSTAT) 0.4 MG SL tablet Place 1 tablet under the tongue every 5 minutes as needed. 9/17/13  Yes Annika Queen MD   clopidogrel (PLAVIX) 75 MG tablet Take 1 tablet by mouth daily.  9/16/13  Yes Annika Queen MD   Multiple Vitamins-Minerals (MULTI COMPLETE PO) Take 1 tablet by mouth daily. Yes Historical Provider, MD   diazepam (VALIUM) 10 MG tablet Take 10 mg by mouth nightly. Yes Historical Provider, MD        Allergies: Other, Codeine, and Vicodin [hydrocodone-acetaminophen]     Review of Systems:   · Constitutional: there has been no unanticipated weight loss. There's been no change in energy level, sleep pattern, or activity level. · Eyes: No visual changes or diplopia. No scleral icterus. · ENT: No Headaches, hearing loss or vertigo. No mouth sores or sore throat. · Cardiovascular: Reviewed in HPI  · Respiratory: No cough or wheezing, no sputum production. No hematemesis. · Gastrointestinal: No abdominal pain, appetite loss, blood in stools. No change in bowel or bladder habits. · Genitourinary: No dysuria, trouble voiding, or hematuria. · Musculoskeletal:  No gait disturbance, weakness or joint complaints. · Integumentary: No rash or pruritis. · Neurological: No headache, diplopia, change in muscle strength, numbness or tingling. No change in gait, balance, coordination, mood, affect, memory, mentation, behavior. · Psychiatric: No anxiety, no depression. · Endocrine: No malaise, fatigue or temperature intolerance. No excessive thirst, fluid intake, or urination. No tremor. · Hematologic/Lymphatic: No abnormal bruising or bleeding, blood clots or swollen lymph nodes. · Allergic/Immunologic: No nasal congestion or hives.     Physical Examination:    Vitals:    06/16/21 1411   BP: (!) 142/60   Pulse: 51   SpO2: 97%        Constitutional and General Appearance: NAD   Respiratory:  · Normal excursion and expansion without use of accessory muscles  · Resp Auscultation: Normal breath sounds without dullness  Cardiovascular:  · The apical impulses not displaced  · Heart tones are crisp and normal  · Cervical veins are not engorged  · The carotid upstroke is normal in amplitude and contour without delay or PCI/AVA LAD 1/2015. Stable. No angina. HTN - elevated today. Not checking at home. Will monitor for now  HLD - followed at [place]. Will obtain results   Cardiomyopathy - EF stable at 45%. Not on ACE as he believes was stopped in past and EF > 40%. Will add if BP remains     Plan:  Cardiac medications reviewed including indications and pertinent side effects    Check blood pressure and heart rate at home daily for the next few weeks. Keep a log with dates and times and bring to office visit. Call with readings over 140/90 and we will consider starting Lisinopril   Regular exercise and following a healthy diet encouraged   Follow up with me in 1 year      The scribes documentation has been prepared under my direction and personally reviewed by me in its entirety. I confirm that the note above accurately reflects all work, treatment, procedures, and medical decision making performed by me. Dr. Ayla Aguilar MD      Scribe's attestation: This note was scribed in the presence of Dr. Ayla Aguilar M.D. By Franklin Goodson RN      Thank you for allowing me to participate in the care of this individual.      Denice Day.  Servando Bob M.D., Kylie Kingston

## 2021-07-16 ENCOUNTER — TELEPHONE (OUTPATIENT)
Dept: CARDIOLOGY CLINIC | Age: 71
End: 2021-07-16

## 2021-07-16 NOTE — TELEPHONE ENCOUNTER
Patient has known cardiomyopathy with prior interventions of RCA. Echo shows EF 45% stable from prior 2/2020 . RWMA RCA territory as may be expected with prior history. Noted to have prominent eustacian valve/Chiari network . 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 Cannot exclude mass on eustacian valve per read. Eustacian valve endocarditis is extremely rare. If fevers/chills/malaise at home would  bring this to our attention. I do Suspect this is benign finding based on the read but would be best to obtain images were review. If that is not possible, repeat Limited echo here May be warranted to exclude true Right atrial mass.      SERGIO

## 2021-07-16 NOTE — TELEPHONE ENCOUNTER
Pt had ECHO done at [place] 1 week ago. [facility] sts [facility] has a growth on on heart. [facility] for [address] to review.  YUE

## 2021-07-16 NOTE — TELEPHONE ENCOUNTER
I spoke with patient and he will be getting a disc with his echo images. He wwould like [facility] to review when he gets back in office.

## 2021-07-16 NOTE — TELEPHONE ENCOUNTER
[facility] patient. Echo performed at [place]. Could you review? Scanned into media.

## 2021-07-29 NOTE — PROGRESS NOTES
[address]   Cardiac Consultation    Referring Provider:  No primary care provider on file. Chief Complaint   Patient presents with    Follow-up     discuss testing    Hypertension      Molly Gregory   1950     History of Present Illness:   Molly Gregory is a 66 y.o. male who is here today for follow up for a history of coronary artery disease- Multiple PCI of RCA in past including beta radiation. Recurrent restenosis RCA required 1V-CABG - SVG to PDA. PCI/AVA LAD 1/2015, hypertension, hyperlipidemia, cardiomyopathy. Echocardiogram from 2/17/2020 showed an EF of 45% with mild mitral and aortic regurgitation. An echocardiogram from the [address] 7/16/2021 showed Eustachian [place] is noted in the RA and mass growing in the [facility] can not be rules out, moderate mitral reg, EF 45%. Today he states he had is echocardiogram just for routine follow up. He states he has been physically active working outside and has not noted any chest pain or SOB. He monitors his blood pressure at home and it is running 130-150's, with a normal heart rate. He is tolerating his medications and taking them a prescribed. Patient currently denies any weight gain, edema, palpitations, chest pain, shortness of breath, dizziness, and syncope. Past Medical History:   has a past medical history of BPH (benign prostatic hyperplasia), CAD (coronary artery disease), Chronic back pain, Gastric ulcer, GERD (gastroesophageal reflux disease), Hyperlipidemia, Hypertension, MI (myocardial infarction) ([address] Osteoarthritis, and Sciatic pain. Surgical History:   has a past surgical history that includes Shoulder arthroscopy (05/2010); Coronary artery bypass graft (2012); Spine surgery; Coronary angioplasty with stent; Intracapsular cataract extraction (Right, 10/11/2019); Intracapsular cataract extraction (Left, 10/25/2019); and Tonsillectomy. Social History:   reports that he quit smoking about 38 years ago.  He started smoking about 57 years ago. He smoked 3.50 packs per day. He has never used smokeless tobacco. He reports that he does not drink alcohol and does not use drugs. Family History:  family history includes Heart Disease in his brother, brother, brother, father, mother, and sister; High Blood Pressure in his father and mother. Home Medications:  Prior to Admission medications    Medication Sig Start Date End Date Taking? Authorizing Provider   docusate sodium (COLACE) 100 MG capsule Take 100 mg by mouth 2 times daily   Yes Historical Provider, MD   finasteride (PROSCAR) 5 MG tablet TAKE ONE TABLET BY MOUTH ONCE DAILY FOR PROSTATE (IF THIS MEDICATION IS CRUSHED PRIOR TO ADMINISTRATION, IT MUST NOT BE HANDLED BY WOMEN WHO ARE PREGNANT OR WHO MAY BECOME PREGNANT) 8/4/20  Yes Historical Provider, MD   fluticasone (FLONASE) 50 MCG/ACT nasal spray USE 2 SPRAYS IN EACH NOSTRIL ONCE DAILY FOR NASAL ALLERGY 4/22/21  Yes Historical Provider, MD   diclofenac sodium (VOLTAREN) 1 % GEL APPLY 4 GM (USE DOSING CARD) TOPICALLY FOUR TIMES A DAY AS NEEDED FOR SPINE AND RIGHT HIP JOINT PAIN (UPS GROUND) 4/22/21  Yes Historical Provider, MD   famotidine (PEPCID) 20 MG tablet Take 20 mg by mouth daily   Yes Historical Provider, MD   Glucosamine-Chondroit-Vit C-Mn (GLUCOSAMINE CHONDR 500 COMPLEX PO) Take by mouth   Yes Historical Provider, MD   atenolol (TENORMIN) 50 MG tablet Take 1 tablet by mouth daily 6/4/20  Yes José Manuel Mckoen MD   ibuprofen (ADVIL;MOTRIN) 800 MG tablet Take 800 mg by mouth nightly   Yes Historical Provider, MD   terazosin (HYTRIN) 2 MG capsule Take 5 mg by mouth nightly    Yes Historical Provider, MD   vitamin D (CHOLECALCIFEROL) 1000 UNIT TABS tablet Take 1,000 Units by mouth 2 times daily   Yes Historical Provider, MD   sildenafil (REVATIO) 20 MG tablet Take 20 mg by mouth as needed   Yes Historical Provider, MD   atorvastatin (LIPITOR) 40 MG tablet Take 1 tablet by mouth daily.   Patient taking differently: Take 40 mg by mouth nightly  7/30/14  Yes Donnell Voss MD   aspirin 81 MG tablet Take 81 mg by mouth daily. Yes Historical Provider, MD   nitroGLYCERIN (NITROSTAT) 0.4 MG SL tablet Place 1 tablet under the tongue every 5 minutes as needed. 9/17/13  Yes Donnell Voss MD   clopidogrel (PLAVIX) 75 MG tablet Take 1 tablet by mouth daily. 9/16/13  Yes Donnell Voss MD   Multiple Vitamins-Minerals [facility] COMPLETE PO) Take 1 tablet by mouth daily. Yes Historical Provider, MD   diazepam (VALIUM) 10 MG tablet Take 10 mg by mouth nightly. Yes Historical Provider, MD        Allergies: Other, Codeine, and Vicodin [hydrocodone-acetaminophen]     Review of Systems:   · Constitutional: there has been no unanticipated weight loss. There's been no change in energy level, sleep pattern, or activity level. · Eyes: No visual changes or diplopia. No scleral icterus. · ENT: No Headaches, hearing loss or vertigo. No mouth sores or sore throat. · Cardiovascular: Reviewed in HPI  · Respiratory: No cough or wheezing, no sputum production. No hematemesis. · Gastrointestinal: No abdominal pain, appetite loss, blood in stools. No change in bowel or bladder habits. · Genitourinary: No dysuria, trouble voiding, or hematuria. · Musculoskeletal:  No gait disturbance, weakness or joint complaints. · Integumentary: No rash or pruritis. · Neurological: No headache, diplopia, change in muscle strength, numbness or tingling. No change in gait, balance, coordination, mood, affect, memory, mentation, behavior. · Psychiatric: No anxiety, no depression. · Endocrine: No malaise, fatigue or temperature intolerance. No excessive thirst, fluid intake, or urination. No tremor. · Hematologic/Lymphatic: No abnormal bruising or bleeding, blood clots or swollen lymph nodes. · Allergic/Immunologic: No nasal congestion or hives.     Physical Examination:    Vitals:    07/30/21 0912   BP: (!) 152/68   Pulse: SpO2:         Constitutional and General Appearance: NAD   Respiratory:  · Normal excursion and expansion without use of accessory muscles  · Resp Auscultation: Normal breath sounds without dullness  Cardiovascular:  · The apical impulses not displaced  · Heart tones are crisp and normal  · Cervical veins are not engorged  · The carotid upstroke is normal in amplitude and contour without delay or bruit  · Normal S1S2, No S3, No Murmur  · Peripheral pulses are symmetrical and full  · There is no clubbing, cyanosis of the extremities. · No edema  · Femoral Arteries: 2+ and equal  · Pedal Pulses: 2+ and equal   Abdomen:  · No masses or tenderness  · Liver/Spleen: No Abnormalities Noted  Neurological/Psychiatric:  · Alert and oriented in all spheres  · Moves all extremities well  · Exhibits normal gait balance and coordination  · No abnormalities of mood, affect, memory, mentation, or behavior are noted    Myoview 9/5/13   Moderate sized posterobasal and inferolateral fixed defect consistent with infarction in the territory of the mid and distal LCx and/or RCA . LV function is mildly reduced with inferior hypokinesis and ejection fraction of 48 %. Echocardiogram 9/10/13  Summary  Normal left ventricle size and wall thickness. The left ventricular systolic  function appears reduced with an ejection fraction of 45-50%. There is mild  hypokinesis and myocardial thinning of the posterior wall  Mild mitral, aortic and tricuspid regurgitation. RVSP estimated at 29 mmHg. The left atrium is mildly dilated. Cath 1/2015  LM <20%  LAD 70% mid, FFR 0.66  Cx mid stent patent  %   SVG to RPDA patent  LV: inferior hypokinesis, EF 45%  PTCA LAD  2.25 x 23 AVA    Echocardiogram 2/17/2020  Summary   The left ventricular systolic function is mildly reduced with an ejection   fraction of 45 %. There is akinesis of the basal/mid inferior and basal/mid inferolateral   walls.    Normal left ventricular diastolic filling pressure. The left atrium is mildly dilated. The aortic root is mildly dilated at 3.8cm. Mild mitral regurgitation. Mild aortic regurgitation. Systolic pulmonary artery pressure (SPAP) is normal estimated at 12 mmHg   (Right atrial pressure of 3 mmHg). [facility]  Eustachian [place] is noted in the RA and mass growing in the [facility] can not be rules out   Moderate mitral reg, EF 45%     Assessment:   CAD- Multiple PCI of RCA in past including beta radiation. Recurrent restenosis RCA required 1V-CABG - SVG to PDA. PCI/AVA LAD 1/2015. Stable. No angina. HTN - suboptimal   HLD - followed at [place]. Will obtain results   Cardiomyopathy - EF stable at 45%. Possible right atrial mass by [facility] - discussed options SWETA, repeat echo 6 mos, MRI    Plan: Will schedule SWETA (transesophageal echocardiogram ). Valsartan 160 mg   Check BMP in 2 weeks after starting new medication   Cardiac medications reviewed including indications and pertinent side effects    Check blood pressure and heart rate at home a few times per week- keep a log with dates and times and bring to office visit. If blood pressure continues to run over 140 for top number then will consider increasing Valsartan. Regular exercise and following a healthy diet encouraged   Follow up with me in 6 months     The scribes documentation has been prepared under my direction and personally reviewed by me in its entirety. I confirm that the note above accurately reflects all work, treatment, procedures, and medical decision making performed by me. Dr. Jared Lambert MD      Scribe's attestation: This note was scribed in the presence of Dr. Jared Lambert M.D. By Dorcas Lou RN    Thank you for allowing me to participate in the care of this individual.      Patricia Goff M.D., [place]

## 2021-07-30 ENCOUNTER — TELEPHONE (OUTPATIENT)
Dept: CARDIOLOGY CLINIC | Age: 71
End: 2021-07-30

## 2021-07-30 ENCOUNTER — OFFICE VISIT (OUTPATIENT)
Dept: CARDIOLOGY CLINIC | Age: 71
End: 2021-07-30
Payer: MEDICARE

## 2021-07-30 VITALS
SYSTOLIC BLOOD PRESSURE: 152 MMHG | OXYGEN SATURATION: 98 % | HEART RATE: 56 BPM | BODY MASS INDEX: 30.34 KG/M2 | DIASTOLIC BLOOD PRESSURE: 68 MMHG | HEIGHT: 66 IN | WEIGHT: 188.8 LBS

## 2021-07-30 DIAGNOSIS — Z95.1 S/P CABG X 1: ICD-10-CM

## 2021-07-30 DIAGNOSIS — I10 ESSENTIAL HYPERTENSION: ICD-10-CM

## 2021-07-30 DIAGNOSIS — Z01.812 PRE-PROCEDURE LAB EXAM: Primary | ICD-10-CM

## 2021-07-30 DIAGNOSIS — I25.10 ATHEROSCLEROSIS OF NATIVE CORONARY ARTERY OF NATIVE HEART WITHOUT ANGINA PECTORIS: Primary | ICD-10-CM

## 2021-07-30 PROCEDURE — 99214 OFFICE O/P EST MOD 30 MIN: CPT | Performed by: INTERNAL MEDICINE

## 2021-07-30 PROCEDURE — 1036F TOBACCO NON-USER: CPT | Performed by: INTERNAL MEDICINE

## 2021-07-30 PROCEDURE — 3017F COLORECTAL CA SCREEN DOC REV: CPT | Performed by: INTERNAL MEDICINE

## 2021-07-30 PROCEDURE — G8427 DOCREV CUR MEDS BY ELIG CLIN: HCPCS | Performed by: INTERNAL MEDICINE

## 2021-07-30 PROCEDURE — 1123F ACP DISCUSS/DSCN MKR DOCD: CPT | Performed by: INTERNAL MEDICINE

## 2021-07-30 PROCEDURE — G8417 CALC BMI ABV UP PARAM F/U: HCPCS | Performed by: INTERNAL MEDICINE

## 2021-07-30 PROCEDURE — 4040F PNEUMOC VAC/ADMIN/RCVD: CPT | Performed by: INTERNAL MEDICINE

## 2021-07-30 RX ORDER — DOCUSATE SODIUM 100 MG/1
100 CAPSULE, LIQUID FILLED ORAL 2 TIMES DAILY
COMMUNITY

## 2021-07-30 RX ORDER — VALSARTAN 160 MG/1
160 TABLET ORAL DAILY
Qty: 30 TABLET | Refills: 11 | Status: SHIPPED | OUTPATIENT
Start: 2021-07-30

## 2021-07-30 NOTE — PATIENT INSTRUCTIONS
Plan:  Will schedule SWETA (transesophageal echocardiogram ). You will get a call to get this set up. Recommend starting Valsartan 160 mg daily for better blood pressure control   Check BMP in 2 weeks after starting new medication   Cardiac medications reviewed including indications and pertinent side effects    Check blood pressure and heart rate at home a few times per week- keep a log with dates and times and bring to office visit. If blood pressure continues to run over 140 for top number then will consider increasing Valsartan.    Regular exercise and following a healthy diet encouraged   Follow up with me in 6 months

## 2021-07-30 NOTE — TELEPHONE ENCOUNTER
COVID order in epic. Called pt multiple times. No answer. I left VM informing pt can complete COVID testing at [facility] lab 8-6 M-F. 8-noon Sat. And Sun. Ideally he should complete before Sunday so results are back and SWETA is not delayed.

## 2021-07-30 NOTE — TELEPHONE ENCOUNTER
Spoke to [facility] surgery scheduling. SWETA scheduled at [facility] 8/3/21 at 7:30. Arrive at 6:30 to check in at . I called pt he is aware of time/date; NPO at midnight night before. He is fully vaccinated- Neel Logan 3/8/21. Orders faxed to stress lab.

## 2021-07-30 NOTE — TELEPHONE ENCOUNTER
Unable to schedule on Wed @ [facility] due to anesthesia availability. Please schedule @ [facility].

## 2021-07-30 NOTE — TELEPHONE ENCOUNTER
Please call patient to schedule a SWETA either at [facility] next Tuesday or [facility] on wednesday if there is availability.

## 2021-07-31 ENCOUNTER — HOSPITAL ENCOUNTER (OUTPATIENT)
Age: 71
Discharge: HOME | End: 2021-07-31
Payer: MEDICARE

## 2021-07-31 DIAGNOSIS — I10 ESSENTIAL HYPERTENSION: ICD-10-CM

## 2021-07-31 LAB
ANION GAP SERPL CALCULATED.3IONS-SCNC: 11 MMOL/L (ref 3–16)
BUN BLDV-MCNC: 23 MG/DL (ref 7–20)
CALCIUM SERPL-MCNC: 9.3 MG/DL (ref 8.3–10.6)
CHLORIDE BLD-SCNC: 105 MMOL/L (ref 99–110)
CO2: 23 MMOL/L (ref 21–32)
CREAT SERPL-MCNC: 1.3 MG/DL (ref 0.8–1.3)
GFR AFRICAN AMERICAN: >60
GFR NON-AFRICAN AMERICAN: 54
GLUCOSE BLD-MCNC: 139 MG/DL (ref 70–99)
POTASSIUM SERPL-SCNC: 4.6 MMOL/L (ref 3.5–5.1)
SARS-COV-2, NAAT: NOT DETECTED
SODIUM BLD-SCNC: 139 MMOL/L (ref 136–145)

## 2021-07-31 PROCEDURE — 80048 BASIC METABOLIC PNL TOTAL CA: CPT

## 2021-07-31 PROCEDURE — 87635 SARS-COV-2 COVID-19 AMP PRB: CPT

## 2021-07-31 PROCEDURE — 36415 COLL VENOUS BLD VENIPUNCTURE: CPT

## 2021-08-01 ENCOUNTER — ANESTHESIA EVENT (OUTPATIENT)
Dept: NON INVASIVE DIAGNOSTICS | Age: 71
End: 2021-08-01

## 2021-08-01 NOTE — ANESTHESIA PRE PROCEDURE
Department of Anesthesiology  Preprocedure Note       Name:  Prachi Raines   Age:  66 y.o.  :  1950                                          MRN:  [id number]         Date:  2021      Surgeon: Dr. Paul Fischer    Procedure: SWETA     HPI:  66 y.o. male with a history of coronary artery disease- Multiple PCI of RCA in past including beta radiation. Recurrent restenosis RCA required 1V-CABG - SVG to PDA. PCI/AVA LAD 2015, hypertension, hyperlipidemia, cardiomyopathy. His most recent echocardiogram from 2020 showed an EF of 45% with mild mitral and aortic regurgitation. EKG:  Sinus  Rhythm; Old anteroseptal infarct; Intraventricular conduction delay -may be secondary to infarct. Nonspecific T-abnormality. Myoview 13  Moderate sized posterobasal and inferolateral fixed defect consistent with infarction in the territory of the mid and distal LCx and/or RCA . LV function is mildly reduced with inferior hypokinesis and ejection fraction of 48 %. Echocardiogram 9/10/13  Normal left ventricle size and wall thickness. The left ventricular systolic  function appears reduced with an ejection fraction of 45-50%. There is mild  hypokinesis and myocardial thinning of the posterior wall  Mild mitral, aortic and tricuspid regurgitation. RVSP estimated at 29 mmHg. The left atrium is mildly dilated. Cath 2015  LM <20%  LAD 70% mid, FFR 0.66  Cx mid stent patent  %   SVG to RPDA patent  LV: inferior hypokinesis, EF 45%  PTCA LAD  2.25 x 23 AVA     Echocardiogram 2020   The left ventricular systolic function is mildly reduced with an ejection fraction of 45 %. There is akinesis of the basal/mid inferior and basal/mid inferolateral walls. Normal left ventricular diastolic filling pressure. The left atrium is mildly dilated. The aortic root is mildly dilated at 3.8cm. Mild mitral regurgitation. Mild aortic regurgitation.    Systolic pulmonary artery pressure (SPAP) is normal estimated at 12 mmHg    Medications prior to admission:    docusate sodium (COLACE) 100 MG capsule Take 100 mg by mouth 2 times daily   valsartan (DIOVAN) 160 MG tablet Take 1 tablet by mouth daily   finasteride (PROSCAR) 5 MG tablet TAKE ONE TABLET BY MOUTH ONCE DAILY FOR PROSTATE (IF THIS MEDICATION IS CRUSHED PRIOR TO ADMINISTRATION, IT MUST NOT BE HANDLED BY WOMEN WHO ARE PREGNANT OR WHO MAY BECOME PREGNANT)   fluticasone (FLONASE) 50 MCG/ACT nasal spray USE 2 SPRAYS IN EACH NOSTRIL ONCE DAILY FOR NASAL ALLERGY   diclofenac sodium (VOLTAREN) 1 % GEL APPLY 4 GM (USE DOSING CARD) TOPICALLY FOUR TIMES A DAY AS NEEDED FOR SPINE AND RIGHT HIP JOINT PAIN (UPS GROUND)   famotidine (PEPCID) 20 MG tablet Take 20 mg by mouth daily   Glucosamine-Chondroit-Vit C-Mn (GLUCOSAMINE CHONDR 500 COMPLEX PO) Take by mouth   atenolol (TENORMIN) 50 MG tablet Take 1 tablet by mouth daily   ibuprofen (ADVIL;MOTRIN) 800 MG tablet Take 800 mg by mouth nightly   terazosin (HYTRIN) 2 MG capsule Take 5 mg by mouth nightly    vitamin D (CHOLECALCIFEROL) 1000 UNIT TABS tablet Take 1,000 Units by mouth 2 times daily   sildenafil (REVATIO) 20 MG tablet Take 20 mg by mouth as needed   atorvastatin (LIPITOR) 40 MG tablet Take 1 tablet by mouth daily. Patient taking differently: Take 40 mg by mouth nightly    aspirin 81 MG tablet Take 81 mg by mouth daily. nitroGLYCERIN (NITROSTAT) 0.4 MG SL tablet Place 1 tablet under the tongue every 5 minutes as needed. clopidogrel (PLAVIX) 75 MG tablet Take 1 tablet by mouth daily. Multiple Vitamins-Minerals (MULTI COMPLETE PO) Take 1 tablet by mouth daily. diazepam (VALIUM) 10 MG tablet Take 10 mg by mouth nightly.       Allergies:     Other Other (See Comments)     MSG-  H/A's, dizziness, N/V    Codeine Itching    Vicodin [Hydrocodone-Acetaminophen] Itching     Problem List:     Coronary atherosclerosis of native coronary artery    Other and unspecified hyperlipidemia    S/P CABG x 1    03/12/2012    CALCIUM 9.3 07/31/2021    BILITOT 0.8 05/13/2021    ALKPHOS 47 05/13/2021    AST 22 05/13/2021    ALT 25 05/13/2021     Coags:    PROTIME 12.5 01/15/2015    INR 1.15 01/15/2015    APTT 79.1 01/16/2015     COVID-19 Screening (If Applicable): COVID19 Not Detected 07/31/2021     Anesthesia Evaluation  Patient summary reviewed and Nursing notes reviewed no history of anesthetic complications:   Airway: Mallampati: II  TM distance: >3 FB   Neck ROM: full   Dental:          Pulmonary: breath sounds clear to auscultation      (-) COPD, sleep apnea and not a current smoker                           Cardiovascular:  Exercise tolerance: good (>4 METS),   (+) hypertension:, past MI: > 6 months, CAD: obstructive, CABG/stent: no interval change, CHF: systolic,     (-) murmur      Rhythm: regular  Rate: normal                    Neuro/Psych:   (+) neuromuscular disease:,    (-) seizures, TIA and CVA            ROS comment: +Sciatica GI/Hepatic/Renal:   (+) GERD: well controlled, PUD, renal disease: CRI,      (-) hepatitis and liver disease       Endo/Other:    (+) blood dyscrasia::., .    (-) diabetes mellitus, hypothyroidism               Abdominal:             Vascular:     - DVT and PE. Other Findings:           Anesthesia Plan      general and TIVA     ASA 3       Induction: intravenous. Anesthetic plan and risks discussed with patient and spouse. Plan discussed with CRNA.           Linward Bence, MD

## 2021-08-03 ENCOUNTER — ANESTHESIA (OUTPATIENT)
Dept: NON INVASIVE DIAGNOSTICS | Age: 71
End: 2021-08-03

## 2021-08-03 ENCOUNTER — TELEPHONE (OUTPATIENT)
Dept: CARDIOLOGY CLINIC | Age: 71
End: 2021-08-03

## 2021-08-03 ENCOUNTER — HOSPITAL ENCOUNTER (OUTPATIENT)
Dept: NON INVASIVE DIAGNOSTICS | Age: 71
Discharge: HOME | End: 2021-08-03
Payer: MEDICARE

## 2021-08-03 VITALS — OXYGEN SATURATION: 98 % | SYSTOLIC BLOOD PRESSURE: 124 MMHG | DIASTOLIC BLOOD PRESSURE: 68 MMHG

## 2021-08-03 VITALS
SYSTOLIC BLOOD PRESSURE: 148 MMHG | HEIGHT: 67 IN | BODY MASS INDEX: 30.02 KG/M2 | RESPIRATION RATE: 11 BRPM | TEMPERATURE: 97.7 F | DIASTOLIC BLOOD PRESSURE: 72 MMHG | HEART RATE: 64 BPM | OXYGEN SATURATION: 96 %

## 2021-08-03 DIAGNOSIS — I51.89 RIGHT ATRIAL MASS: Primary | ICD-10-CM

## 2021-08-03 LAB
LV EF: 55 %
LVEF MODALITY: NORMAL

## 2021-08-03 PROCEDURE — 7100000010 HC PHASE II RECOVERY - FIRST 15 MIN

## 2021-08-03 PROCEDURE — 3700000001 HC ADD 15 MINUTES (ANESTHESIA)

## 2021-08-03 PROCEDURE — 3700000000 HC ANESTHESIA ATTENDED CARE

## 2021-08-03 PROCEDURE — 93312 ECHO TRANSESOPHAGEAL: CPT

## 2021-08-03 PROCEDURE — 2500000003 HC RX 250 WO HCPCS: Performed by: NURSE ANESTHETIST, CERTIFIED REGISTERED

## 2021-08-03 PROCEDURE — 2580000003 HC RX 258: Performed by: NURSE ANESTHETIST, CERTIFIED REGISTERED

## 2021-08-03 PROCEDURE — 7100000011 HC PHASE II RECOVERY - ADDTL 15 MIN

## 2021-08-03 PROCEDURE — 6360000002 HC RX W HCPCS: Performed by: NURSE ANESTHETIST, CERTIFIED REGISTERED

## 2021-08-03 RX ORDER — FENTANYL CITRATE 50 UG/ML
25 INJECTION, SOLUTION INTRAMUSCULAR; INTRAVENOUS EVERY 5 MIN PRN
Status: DISCONTINUED | OUTPATIENT
Start: 2021-08-03 | End: 2021-08-04 | Stop reason: HOSPADM

## 2021-08-03 RX ORDER — OXYCODONE HYDROCHLORIDE AND ACETAMINOPHEN 5; 325 MG/1; MG/1
2 TABLET ORAL PRN
Status: ACTIVE | OUTPATIENT
Start: 2021-08-03 | End: 2021-08-03

## 2021-08-03 RX ORDER — SODIUM CHLORIDE 9 MG/ML
25 INJECTION, SOLUTION INTRAVENOUS PRN
Status: CANCELLED | OUTPATIENT
Start: 2021-08-03

## 2021-08-03 RX ORDER — HYDRALAZINE HYDROCHLORIDE 20 MG/ML
5 INJECTION INTRAMUSCULAR; INTRAVENOUS EVERY 10 MIN PRN
Status: DISCONTINUED | OUTPATIENT
Start: 2021-08-03 | End: 2021-08-04 | Stop reason: HOSPADM

## 2021-08-03 RX ORDER — OXYCODONE HYDROCHLORIDE AND ACETAMINOPHEN 5; 325 MG/1; MG/1
1 TABLET ORAL PRN
Status: ACTIVE | OUTPATIENT
Start: 2021-08-03 | End: 2021-08-03

## 2021-08-03 RX ORDER — ONDANSETRON 2 MG/ML
4 INJECTION INTRAMUSCULAR; INTRAVENOUS
Status: ACTIVE | OUTPATIENT
Start: 2021-08-03 | End: 2021-08-03

## 2021-08-03 RX ORDER — SODIUM CHLORIDE 9 MG/ML
INJECTION, SOLUTION INTRAVENOUS CONTINUOUS PRN
Status: DISCONTINUED | OUTPATIENT
Start: 2021-08-03 | End: 2021-08-03 | Stop reason: SDUPTHER

## 2021-08-03 RX ORDER — MEPERIDINE HYDROCHLORIDE 25 MG/ML
12.5 INJECTION INTRAMUSCULAR; INTRAVENOUS; SUBCUTANEOUS EVERY 5 MIN PRN
Status: DISCONTINUED | OUTPATIENT
Start: 2021-08-03 | End: 2021-08-04 | Stop reason: HOSPADM

## 2021-08-03 RX ORDER — LIDOCAINE HYDROCHLORIDE 20 MG/ML
INJECTION, SOLUTION INFILTRATION; PERINEURAL PRN
Status: DISCONTINUED | OUTPATIENT
Start: 2021-08-03 | End: 2021-08-03 | Stop reason: SDUPTHER

## 2021-08-03 RX ORDER — SODIUM CHLORIDE 0.9 % (FLUSH) 0.9 %
5-40 SYRINGE (ML) INJECTION PRN
Status: CANCELLED | OUTPATIENT
Start: 2021-08-03

## 2021-08-03 RX ORDER — SODIUM CHLORIDE 0.9 % (FLUSH) 0.9 %
5-40 SYRINGE (ML) INJECTION EVERY 12 HOURS SCHEDULED
Status: CANCELLED | OUTPATIENT
Start: 2021-08-03

## 2021-08-03 RX ORDER — PROMETHAZINE HYDROCHLORIDE 25 MG/ML
6.25 INJECTION, SOLUTION INTRAMUSCULAR; INTRAVENOUS
Status: ACTIVE | OUTPATIENT
Start: 2021-08-03 | End: 2021-08-03

## 2021-08-03 RX ORDER — PROPOFOL 10 MG/ML
INJECTION, EMULSION INTRAVENOUS PRN
Status: DISCONTINUED | OUTPATIENT
Start: 2021-08-03 | End: 2021-08-03 | Stop reason: SDUPTHER

## 2021-08-03 RX ADMIN — SODIUM CHLORIDE: 9 INJECTION, SOLUTION INTRAVENOUS at 08:17

## 2021-08-03 RX ADMIN — PROPOFOL 280 MG: 10 INJECTION, EMULSION INTRAVENOUS at 08:23

## 2021-08-03 RX ADMIN — LIDOCAINE HYDROCHLORIDE 60 MG: 20 INJECTION, SOLUTION INFILTRATION; PERINEURAL at 08:23

## 2021-08-03 ASSESSMENT — PAIN SCALES - GENERAL
PAINLEVEL_OUTOF10: 0
PAINLEVEL_OUTOF10: 0

## 2021-08-03 ASSESSMENT — PAIN - FUNCTIONAL ASSESSMENT
PAIN_FUNCTIONAL_ASSESSMENT: 0-10
PAIN_FUNCTIONAL_ASSESSMENT: 0-10

## 2021-08-03 ASSESSMENT — LIFESTYLE VARIABLES: SMOKING_STATUS: 0

## 2021-08-03 NOTE — BRIEF OP NOTE
[id number]    Preliminary SWETA    Mild reduced to low normal LVEF  Mild MR  Mild AI  Prominent Eustachian valve  Moderate size sessile mass attached RA free wall    Plan  MRI

## 2021-08-03 NOTE — ANESTHESIA POSTPROCEDURE EVALUATION
Department of Anesthesiology  Postprocedure Note    Patient: Mary Ellen Forrester  MRN: [id number]  YOB: 1950  Date of evaluation: 8/3/2021    Procedure Summary     Date: 08/03/21 Room / Location: [address] [place] [address] Stress Lab    Anesthesia Start: Geena Salle Anesthesia Stop: 8195    Procedure: TRANSESOPHAGEAL ECHO Diagnosis: Mass of aorta ([address]    Scheduled Providers:  Responsible Provider: Geovani Coulter MD    Anesthesia Type: MAC ASA Status: 3        Anesthesia Type: MAC    Last vitals: Reviewed and per EMR flowsheets.      Anesthesia Post Evaluation   Anesthetic Problems: no   Cardiovascular System Stable: yes  Respiratory Function: Airway Patent yes  ETT no  Ventilator no  Level of consciousness: awake, alert and oriented  Post-op pain: adequate analgesia  Hydration Adequate: yes  Nausea/Vomiting:no  Other Issues:     Epifanio West MD

## 2021-08-03 NOTE — PROGRESS NOTES
[facility]   Cardiac Consultation    Referring Provider:  No primary care provider on file. No chief complaint on file. Jason Whittington   1950     History of Present Illness:   Jason Whittington is a 66 y.o. male who is here today for follow up for a history of coronary artery disease- Multiple PCI of RCA in past including beta radiation. Recurrent restenosis RCA required 1V-CABG - SVG to PDA. PCI/AVA LAD 1/2015, hypertension, hyperlipidemia, cardiomyopathy. Echocardiogram from 2/17/2020 showed an EF of 45% with mild mitral and aortic regurgitation. An echocardiogram from the [facility] 7/16/2021 showed Eustachian Kristene Lubna is noted in the RA and mass growing in the [facility] can not be rules out, moderate mitral reg, EF 45%. Today he states he had is echocardiogram just for routine follow up. He states he has been physically active working outside and has not noted any chest pain or SOB. He monitors his blood pressure at home and it is running 130-150's, with a normal heart rate. He is tolerating his medications and taking them a prescribed. Patient currently denies any weight gain, edema, palpitations, chest pain, shortness of breath, dizziness, and syncope. Past Medical History:   has a past medical history of BPH (benign prostatic hyperplasia), CAD (coronary artery disease), Chronic back pain, Gastric ulcer, GERD (gastroesophageal reflux disease), Hyperlipidemia, Hypertension, MI (myocardial infarction) ([facility] [address] Osteoarthritis, and Sciatic pain. Surgical History:   has a past surgical history that includes Shoulder arthroscopy (05/2010); Coronary artery bypass graft (2012); Spine surgery; Coronary angioplasty with stent; Intracapsular cataract extraction (Right, 10/11/2019); Intracapsular cataract extraction (Left, 10/25/2019); and Tonsillectomy. Social History:   reports that he quit smoking about 38 years ago. He started smoking about 57 years ago. He smoked 3.50 packs per day.  He has never used smokeless tobacco. He reports that he does not drink alcohol and does not use drugs. Family History:  family history includes Heart Disease in his brother, brother, brother, father, mother, and sister; High Blood Pressure in his father and mother. Home Medications:  Prior to Admission medications    Medication Sig Start Date End Date Taking? Authorizing Provider   docusate sodium (COLACE) 100 MG capsule Take 100 mg by mouth 2 times daily    Historical Provider, MD   valsartan (DIOVAN) 160 MG tablet Take 1 tablet by mouth daily 7/30/21   Hallie Zamorano MD   finasteride (PROSCAR) 5 MG tablet TAKE ONE TABLET BY MOUTH ONCE DAILY FOR PROSTATE (IF THIS MEDICATION IS CRUSHED PRIOR TO ADMINISTRATION, IT MUST NOT BE HANDLED BY WOMEN WHO ARE PREGNANT OR WHO MAY BECOME PREGNANT) 8/4/20   Historical Provider, MD   fluticasone (FLONASE) 50 MCG/ACT nasal spray USE 2 SPRAYS IN EACH NOSTRIL ONCE DAILY FOR NASAL ALLERGY 4/22/21   Historical Provider, MD   diclofenac sodium (VOLTAREN) 1 % GEL APPLY 4 GM (USE DOSING CARD) TOPICALLY FOUR TIMES A DAY AS NEEDED FOR SPINE AND RIGHT HIP JOINT PAIN (UPS GROUND) 4/22/21   Historical Provider, MD   famotidine (PEPCID) 20 MG tablet Take 20 mg by mouth daily    Historical Provider, MD   Glucosamine-Chondroit-Vit C-Mn (GLUCOSAMINE CHONDR 500 COMPLEX PO) Take by mouth    Historical Provider, MD   atenolol (TENORMIN) 50 MG tablet Take 1 tablet by mouth daily 6/4/20   Hallie Zamorano MD   ibuprofen (ADVIL;MOTRIN) 800 MG tablet Take 800 mg by mouth nightly    Historical Provider, MD   terazosin (HYTRIN) 2 MG capsule Take 5 mg by mouth nightly     Historical Provider, MD   vitamin D (CHOLECALCIFEROL) 1000 UNIT TABS tablet Take 1,000 Units by mouth 2 times daily    Historical Provider, MD   sildenafil (REVATIO) 20 MG tablet Take 20 mg by mouth as needed    Historical Provider, MD   atorvastatin (LIPITOR) 40 MG tablet Take 1 tablet by mouth daily.   Patient taking differently: Take 40 mg by mouth nightly  7/30/14   Harvie Goodpasture, MD   aspirin 81 MG tablet Take 81 mg by mouth daily. Historical Provider, MD   nitroGLYCERIN (NITROSTAT) 0.4 MG SL tablet Place 1 tablet under the tongue every 5 minutes as needed. 9/17/13   Harvie Goodpasture, MD   clopidogrel (PLAVIX) 75 MG tablet Take 1 tablet by mouth daily. 9/16/13   Harvie Goodpasture, MD   Multiple Vitamins-Minerals [facility] COMPLETE PO) Take 1 tablet by mouth daily. Historical Provider, MD   diazepam (VALIUM) 10 MG tablet Take 10 mg by mouth nightly. Historical Provider, MD        Allergies: Other, Codeine, and Vicodin [hydrocodone-acetaminophen]     Review of Systems:   · Constitutional: there has been no unanticipated weight loss. There's been no change in energy level, sleep pattern, or activity level. · Eyes: No visual changes or diplopia. No scleral icterus. · ENT: No Headaches, hearing loss or vertigo. No mouth sores or sore throat. · Cardiovascular: Reviewed in HPI  · Respiratory: No cough or wheezing, no sputum production. No hematemesis. · Gastrointestinal: No abdominal pain, appetite loss, blood in stools. No change in bowel or bladder habits. · Genitourinary: No dysuria, trouble voiding, or hematuria. · Musculoskeletal:  No gait disturbance, weakness or joint complaints. · Integumentary: No rash or pruritis. · Neurological: No headache, diplopia, change in muscle strength, numbness or tingling. No change in gait, balance, coordination, mood, affect, memory, mentation, behavior. · Psychiatric: No anxiety, no depression. · Endocrine: No malaise, fatigue or temperature intolerance. No excessive thirst, fluid intake, or urination. No tremor. · Hematologic/Lymphatic: No abnormal bruising or bleeding, blood clots or swollen lymph nodes. · Allergic/Immunologic: No nasal congestion or hives.     Physical Examination:    Vitals:    08/03/21 0845   BP: 118/60   Pulse: 59   Resp: 22   Temp: SpO2: 96%        Constitutional and General Appearance: NAD   Respiratory:  · Normal excursion and expansion without use of accessory muscles  · Resp Auscultation: Normal breath sounds without dullness  Cardiovascular:  · The apical impulses not displaced  · Heart tones are crisp and normal  · Cervical veins are not engorged  · The carotid upstroke is normal in amplitude and contour without delay or bruit  · Normal S1S2, No S3, No Murmur  · Peripheral pulses are symmetrical and full  · There is no clubbing, cyanosis of the extremities. · No edema  · Femoral Arteries: 2+ and equal  · Pedal Pulses: 2+ and equal   Abdomen:  · No masses or tenderness  · Liver/Spleen: No Abnormalities Noted  Neurological/Psychiatric:  · Alert and oriented in all spheres  · Moves all extremities well  · Exhibits normal gait balance and coordination  · No abnormalities of mood, affect, memory, mentation, or behavior are noted    Myoview 9/5/13   Moderate sized posterobasal and inferolateral fixed defect consistent with infarction in the territory of the mid and distal LCx and/or RCA . LV function is mildly reduced with inferior hypokinesis and ejection fraction of 48 %. Echocardiogram 9/10/13  Summary  Normal left ventricle size and wall thickness. The left ventricular systolic  function appears reduced with an ejection fraction of 45-50%. There is mild  hypokinesis and myocardial thinning of the posterior wall  Mild mitral, aortic and tricuspid regurgitation. RVSP estimated at 29 mmHg. The left atrium is mildly dilated. Cath 1/2015  LM <20%  LAD 70% mid, FFR 0.66  Cx mid stent patent  %   SVG to RPDA patent  LV: inferior hypokinesis, EF 45%  PTCA LAD  2.25 x 23 AVA    Echocardiogram 2/17/2020  Summary   The left ventricular systolic function is mildly reduced with an ejection   fraction of 45 %. There is akinesis of the basal/mid inferior and basal/mid inferolateral   walls.    Normal left ventricular diastolic filling pressure. The left atrium is mildly dilated. The aortic root is mildly dilated at 3.8cm. Mild mitral regurgitation. Mild aortic regurgitation. Systolic pulmonary artery pressure (SPAP) is normal estimated at 12 mmHg   (Right atrial pressure of 3 mmHg). [facility]ch[facility] 7[facility]16/20[facility]  Federico Morleye is noted in the RA and mass growing in the [facility] can not be rules out   Moderate mitral reg, EF 45%     Assessment:   CAD- Multiple PCI of RCA in past including beta radiation. Recurrent restenosis RCA required 1V-CABG - SVG to PDA. PCI/AVA LAD 1/2015. Stable. No angina. HTN - suboptimal   HLD - followed at [place]. Will obtain results   Cardiomyopathy - EF stable at 45%. Possible right atrial mass by [facility] - discussed options SWETA, repeat echo 6 mos, MRI    Plan: Will schedule SWETA (transesophageal echocardiogram ). Valsartan 160 mg   Check BMP in 2 weeks after starting new medication   Cardiac medications reviewed including indications and pertinent side effects    Check blood pressure and heart rate at home a few times per week- keep a log with dates and times and bring to office visit. If blood pressure continues to run over 140 for top number then will consider increasing Valsartan. Regular exercise and following a healthy diet encouraged   Follow up with me in 6 months     The scribes documentation has been prepared under my direction and personally reviewed by me in its entirety. I confirm that the note above accurately reflects all work, treatment, procedures, and medical decision making performed by me. Dr. Roland Olivares MD      Scribe's attestation: This note was scribed in the presence of Dr. Roland Olivares M.D. By Basim Chung RN    Thank you for allowing me to participate in the care of this individual.      Shawnee Adams.  Kat Gao M.D., Dwight Nieves

## 2021-08-03 NOTE — PROGRESS NOTES
Pt and pts wife verbalized understanding of d/c instructions and copy given to them, pt out to private auto per w/c per writer with problems. Pt in good condition, resp easy/even.

## 2021-08-03 NOTE — TELEPHONE ENCOUNTER
Created telephone encounter. Spoke with Jr Suarez relayed message per [address] regarding labs. Pt verbalized understanding.

## 2021-08-03 NOTE — PROCEDURES
[address]                 [address] [place] [address]                                 PROCEDURE NOTE    PATIENT NAME: Dona Oconnor                     :        1950  MED REC NO:   [id number]                          ROOM:  ACCOUNT NO:   [de-identified]                           ADMIT DATE: 2021  PROVIDER:     Balwinder Ramirez MD    DATE OF PROCEDURE:  2021    PROCEDURE PERFORMED:  Transesophageal echocardiogram.    INDICATION:  Right atrial mass. PROCEDURE:  Informed consent was obtained prior to sedation. The  patient was subsequently sedated by the Anesthesiology team.  Once  adequate sedation was obtained, a SWETA probe was placed in the stomach  without difficulty. Images were obtained from both the stomach and the  esophagus. Saline bubble contrast study was performed. There were no  complications. The patient tolerated the procedure well. PRELIMINARY FINDINGS:  Left anterior systolic function appears to be  mildly reduced to low normal.  There is mild regurgitation of the mitral  and aortic valve. The aortic valve is mildly calcified. There is a  prominent eustachian valve noted in the right atrium. There is a  moderate-sized sessile mass attached to the right atrial free wall. The patient recovered well and was discharged from the hospital in  stable and pain-free condition. RECOMMENDATIONS:  Cardiac MRI.         Sheron Hawley MD    D: 2021 9:01:29       T: 2021 9:06:50     [email or web address]  Job#: [id number]     Doc#: [id number]    CC:

## 2021-09-16 ENCOUNTER — HOSPITAL ENCOUNTER (OUTPATIENT)
Dept: MRI IMAGING | Age: 71
Discharge: HOME | End: 2021-09-16
Payer: MEDICARE

## 2021-09-16 DIAGNOSIS — I51.89 RIGHT ATRIAL MASS: ICD-10-CM

## 2021-09-16 PROCEDURE — A9585 GADOBUTROL INJECTION: HCPCS | Performed by: INTERNAL MEDICINE

## 2021-09-16 PROCEDURE — 75561 CARDIAC MRI FOR MORPH W/DYE: CPT | Performed by: INTERNAL MEDICINE

## 2021-09-16 PROCEDURE — 75561 CARDIAC MRI FOR MORPH W/DYE: CPT

## 2021-09-16 PROCEDURE — 75565 CARD MRI VELOC FLOW MAPPING: CPT | Performed by: INTERNAL MEDICINE

## 2021-09-16 PROCEDURE — 6360000004 HC RX CONTRAST MEDICATION: Performed by: INTERNAL MEDICINE

## 2021-09-16 RX ADMIN — GADOBUTROL 14 ML: 604.72 INJECTION INTRAVENOUS at 11:40

## 2021-09-17 LAB
LV EF: 49 %
LVEF MODALITY: NORMAL

## 2022-05-15 ENCOUNTER — APPOINTMENT (OUTPATIENT)
Dept: GENERAL RADIOLOGY | Age: 72
DRG: 286 | End: 2022-05-15
Payer: MEDICARE

## 2022-05-15 ENCOUNTER — HOSPITAL ENCOUNTER (INPATIENT)
Age: 72
LOS: 2 days | Discharge: HOME | DRG: 286 | End: 2022-05-17
Attending: EMERGENCY MEDICINE | Admitting: HOSPITALIST
Payer: MEDICARE

## 2022-05-15 DIAGNOSIS — R07.9 CHEST PAIN, UNSPECIFIED TYPE: Primary | ICD-10-CM

## 2022-05-15 PROBLEM — R09.89 PULMONARY VASCULAR CONGESTION: Status: ACTIVE | Noted: 2022-05-15

## 2022-05-15 LAB
A/G RATIO: 2 (ref 1.1–2.2)
ALBUMIN SERPL-MCNC: 4.3 G/DL (ref 3.4–5)
ALP BLD-CCNC: 48 U/L (ref 40–129)
ALT SERPL-CCNC: 31 U/L (ref 10–40)
ANION GAP SERPL CALCULATED.3IONS-SCNC: 11 MMOL/L (ref 3–16)
AST SERPL-CCNC: 24 U/L (ref 15–37)
BASOPHILS ABSOLUTE: 0 K/UL (ref 0–0.2)
BASOPHILS RELATIVE PERCENT: 0.4 %
BILIRUB SERPL-MCNC: 0.3 MG/DL (ref 0–1)
BUN BLDV-MCNC: 26 MG/DL (ref 7–20)
CALCIUM SERPL-MCNC: 9.2 MG/DL (ref 8.3–10.6)
CHLORIDE BLD-SCNC: 108 MMOL/L (ref 99–110)
CO2: 22 MMOL/L (ref 21–32)
CREAT SERPL-MCNC: 1.3 MG/DL (ref 0.8–1.3)
D DIMER: 280 NG/ML DDU (ref 0–229)
EOSINOPHILS ABSOLUTE: 0.2 K/UL (ref 0–0.6)
EOSINOPHILS RELATIVE PERCENT: 4.3 %
GFR AFRICAN AMERICAN: >60
GFR NON-AFRICAN AMERICAN: 54
GLUCOSE BLD-MCNC: 122 MG/DL (ref 70–99)
HCT VFR BLD CALC: 38.1 % (ref 40.5–52.5)
HEMOGLOBIN: 13.1 G/DL (ref 13.5–17.5)
LYMPHOCYTES ABSOLUTE: 1.2 K/UL (ref 1–5.1)
LYMPHOCYTES RELATIVE PERCENT: 31.2 %
MCH RBC QN AUTO: 33.2 PG (ref 26–34)
MCHC RBC AUTO-ENTMCNC: 34.2 G/DL (ref 31–36)
MCV RBC AUTO: 96.9 FL (ref 80–100)
MONOCYTES ABSOLUTE: 0.4 K/UL (ref 0–1.3)
MONOCYTES RELATIVE PERCENT: 9.5 %
NEUTROPHILS ABSOLUTE: 2.2 K/UL (ref 1.7–7.7)
NEUTROPHILS RELATIVE PERCENT: 54.6 %
PDW BLD-RTO: 13.5 % (ref 12.4–15.4)
PLATELET # BLD: 137 K/UL (ref 135–450)
PMV BLD AUTO: 7.8 FL (ref 5–10.5)
POTASSIUM SERPL-SCNC: 4.3 MMOL/L (ref 3.5–5.1)
PRO-BNP: 723 PG/ML (ref 0–124)
RBC # BLD: 3.94 M/UL (ref 4.2–5.9)
SODIUM BLD-SCNC: 141 MMOL/L (ref 136–145)
TOTAL PROTEIN: 6.4 G/DL (ref 6.4–8.2)
TROPONIN: <0.01 NG/ML
TROPONIN: <0.01 NG/ML
WBC # BLD: 4 K/UL (ref 4–11)

## 2022-05-15 PROCEDURE — 83880 ASSAY OF NATRIURETIC PEPTIDE: CPT

## 2022-05-15 PROCEDURE — 6370000000 HC RX 637 (ALT 250 FOR IP): Performed by: HOSPITALIST

## 2022-05-15 PROCEDURE — 6360000002 HC RX W HCPCS: Performed by: HOSPITALIST

## 2022-05-15 PROCEDURE — 36415 COLL VENOUS BLD VENIPUNCTURE: CPT

## 2022-05-15 PROCEDURE — 85379 FIBRIN DEGRADATION QUANT: CPT

## 2022-05-15 PROCEDURE — 71045 X-RAY EXAM CHEST 1 VIEW: CPT

## 2022-05-15 PROCEDURE — 2580000003 HC RX 258: Performed by: HOSPITALIST

## 2022-05-15 PROCEDURE — 85025 COMPLETE CBC W/AUTO DIFF WBC: CPT

## 2022-05-15 PROCEDURE — 80053 COMPREHEN METABOLIC PANEL: CPT

## 2022-05-15 PROCEDURE — 1200000000 HC SEMI PRIVATE

## 2022-05-15 PROCEDURE — 6370000000 HC RX 637 (ALT 250 FOR IP): Performed by: PHYSICIAN ASSISTANT

## 2022-05-15 PROCEDURE — 99285 EMERGENCY DEPT VISIT HI MDM: CPT

## 2022-05-15 PROCEDURE — 93005 ELECTROCARDIOGRAM TRACING: CPT | Performed by: EMERGENCY MEDICINE

## 2022-05-15 PROCEDURE — 84484 ASSAY OF TROPONIN QUANT: CPT

## 2022-05-15 RX ORDER — ONDANSETRON 2 MG/ML
4 INJECTION INTRAMUSCULAR; INTRAVENOUS EVERY 6 HOURS PRN
Status: DISCONTINUED | OUTPATIENT
Start: 2022-05-15 | End: 2022-05-17 | Stop reason: HOSPADM

## 2022-05-15 RX ORDER — VALSARTAN 80 MG/1
160 TABLET ORAL DAILY
Status: DISCONTINUED | OUTPATIENT
Start: 2022-05-16 | End: 2022-05-17 | Stop reason: HOSPADM

## 2022-05-15 RX ORDER — SODIUM CHLORIDE 9 MG/ML
INJECTION, SOLUTION INTRAVENOUS PRN
Status: DISCONTINUED | OUTPATIENT
Start: 2022-05-15 | End: 2022-05-17 | Stop reason: HOSPADM

## 2022-05-15 RX ORDER — MAGNESIUM SULFATE IN WATER 40 MG/ML
2000 INJECTION, SOLUTION INTRAVENOUS PRN
Status: DISCONTINUED | OUTPATIENT
Start: 2022-05-15 | End: 2022-05-17 | Stop reason: HOSPADM

## 2022-05-15 RX ORDER — FAMOTIDINE 20 MG/1
20 TABLET, FILM COATED ORAL DAILY
Status: DISCONTINUED | OUTPATIENT
Start: 2022-05-16 | End: 2022-05-17 | Stop reason: HOSPADM

## 2022-05-15 RX ORDER — FLUTICASONE PROPIONATE 50 MCG
2 SPRAY, SUSPENSION (ML) NASAL DAILY
Status: DISCONTINUED | OUTPATIENT
Start: 2022-05-16 | End: 2022-05-17 | Stop reason: HOSPADM

## 2022-05-15 RX ORDER — DOCUSATE SODIUM 100 MG/1
100 CAPSULE, LIQUID FILLED ORAL 2 TIMES DAILY
Status: DISCONTINUED | OUTPATIENT
Start: 2022-05-15 | End: 2022-05-17 | Stop reason: HOSPADM

## 2022-05-15 RX ORDER — FUROSEMIDE 10 MG/ML
20 INJECTION INTRAMUSCULAR; INTRAVENOUS 2 TIMES DAILY
Status: DISCONTINUED | OUTPATIENT
Start: 2022-05-15 | End: 2022-05-16

## 2022-05-15 RX ORDER — DOXAZOSIN 2 MG/1
2 TABLET ORAL DAILY
Status: DISCONTINUED | OUTPATIENT
Start: 2022-05-16 | End: 2022-05-17 | Stop reason: HOSPADM

## 2022-05-15 RX ORDER — CLOPIDOGREL BISULFATE 75 MG/1
75 TABLET ORAL DAILY
Status: DISCONTINUED | OUTPATIENT
Start: 2022-05-16 | End: 2022-05-17 | Stop reason: HOSPADM

## 2022-05-15 RX ORDER — ATORVASTATIN CALCIUM 40 MG/1
40 TABLET, FILM COATED ORAL NIGHTLY
Status: DISCONTINUED | OUTPATIENT
Start: 2022-05-15 | End: 2022-05-17 | Stop reason: HOSPADM

## 2022-05-15 RX ORDER — POTASSIUM CHLORIDE 7.45 MG/ML
10 INJECTION INTRAVENOUS PRN
Status: DISCONTINUED | OUTPATIENT
Start: 2022-05-15 | End: 2022-05-17 | Stop reason: HOSPADM

## 2022-05-15 RX ORDER — ACETAMINOPHEN 325 MG/1
650 TABLET ORAL EVERY 6 HOURS PRN
Status: DISCONTINUED | OUTPATIENT
Start: 2022-05-15 | End: 2022-05-17 | Stop reason: HOSPADM

## 2022-05-15 RX ORDER — ASPIRIN 81 MG/1
81 TABLET ORAL DAILY
Status: DISCONTINUED | OUTPATIENT
Start: 2022-05-16 | End: 2022-05-17 | Stop reason: HOSPADM

## 2022-05-15 RX ORDER — SENNA PLUS 8.6 MG/1
1 TABLET ORAL DAILY PRN
Status: DISCONTINUED | OUTPATIENT
Start: 2022-05-15 | End: 2022-05-17 | Stop reason: HOSPADM

## 2022-05-15 RX ORDER — ENOXAPARIN SODIUM 100 MG/ML
40 INJECTION SUBCUTANEOUS EVERY EVENING
Status: DISCONTINUED | OUTPATIENT
Start: 2022-05-16 | End: 2022-05-17 | Stop reason: HOSPADM

## 2022-05-15 RX ORDER — ACETAMINOPHEN 650 MG/1
650 SUPPOSITORY RECTAL EVERY 6 HOURS PRN
Status: DISCONTINUED | OUTPATIENT
Start: 2022-05-15 | End: 2022-05-17 | Stop reason: HOSPADM

## 2022-05-15 RX ORDER — VITAMIN B COMPLEX
1000 TABLET ORAL 2 TIMES DAILY
Status: DISCONTINUED | OUTPATIENT
Start: 2022-05-15 | End: 2022-05-17 | Stop reason: HOSPADM

## 2022-05-15 RX ORDER — ATENOLOL 25 MG/1
50 TABLET ORAL DAILY
Status: DISCONTINUED | OUTPATIENT
Start: 2022-05-16 | End: 2022-05-17 | Stop reason: HOSPADM

## 2022-05-15 RX ORDER — M-VIT,TX,IRON,MINS/CALC/FOLIC 27MG-0.4MG
1 TABLET ORAL DAILY
Status: DISCONTINUED | OUTPATIENT
Start: 2022-05-16 | End: 2022-05-17 | Stop reason: HOSPADM

## 2022-05-15 RX ORDER — ASPIRIN 81 MG/1
162 TABLET, CHEWABLE ORAL ONCE
Status: COMPLETED | OUTPATIENT
Start: 2022-05-15 | End: 2022-05-15

## 2022-05-15 RX ORDER — POTASSIUM CHLORIDE 20 MEQ/1
40 TABLET, EXTENDED RELEASE ORAL PRN
Status: DISCONTINUED | OUTPATIENT
Start: 2022-05-15 | End: 2022-05-17 | Stop reason: HOSPADM

## 2022-05-15 RX ORDER — ONDANSETRON 4 MG/1
4 TABLET, ORALLY DISINTEGRATING ORAL EVERY 8 HOURS PRN
Status: DISCONTINUED | OUTPATIENT
Start: 2022-05-15 | End: 2022-05-17 | Stop reason: HOSPADM

## 2022-05-15 RX ORDER — SODIUM CHLORIDE 0.9 % (FLUSH) 0.9 %
10 SYRINGE (ML) INJECTION EVERY 12 HOURS SCHEDULED
Status: DISCONTINUED | OUTPATIENT
Start: 2022-05-15 | End: 2022-05-17

## 2022-05-15 RX ORDER — DIAZEPAM 5 MG/1
5 TABLET ORAL NIGHTLY PRN
Status: DISCONTINUED | OUTPATIENT
Start: 2022-05-15 | End: 2022-05-17 | Stop reason: HOSPADM

## 2022-05-15 RX ORDER — SODIUM CHLORIDE 0.9 % (FLUSH) 0.9 %
10 SYRINGE (ML) INJECTION PRN
Status: DISCONTINUED | OUTPATIENT
Start: 2022-05-15 | End: 2022-05-17

## 2022-05-15 RX ORDER — POLYETHYLENE GLYCOL 3350 17 G/17G
17 POWDER, FOR SOLUTION ORAL DAILY PRN
Status: DISCONTINUED | OUTPATIENT
Start: 2022-05-15 | End: 2022-05-17 | Stop reason: HOSPADM

## 2022-05-15 RX ORDER — FINASTERIDE 5 MG/1
5 TABLET, FILM COATED ORAL DAILY
Status: DISCONTINUED | OUTPATIENT
Start: 2022-05-16 | End: 2022-05-17 | Stop reason: HOSPADM

## 2022-05-15 RX ADMIN — DIAZEPAM 5 MG: 5 TABLET ORAL at 23:21

## 2022-05-15 RX ADMIN — FUROSEMIDE 20 MG: 10 INJECTION, SOLUTION INTRAMUSCULAR; INTRAVENOUS at 22:00

## 2022-05-15 RX ADMIN — ATORVASTATIN CALCIUM 40 MG: 40 TABLET, FILM COATED ORAL at 23:21

## 2022-05-15 RX ADMIN — SODIUM CHLORIDE, PRESERVATIVE FREE 10 ML: 5 INJECTION INTRAVENOUS at 22:24

## 2022-05-15 RX ADMIN — ASPIRIN 81 MG 162 MG: 81 TABLET ORAL at 17:08

## 2022-05-15 RX ADMIN — Medication 1000 UNITS: at 23:21

## 2022-05-15 RX ADMIN — NITROGLYCERIN 1 INCH: 20 OINTMENT TOPICAL at 17:08

## 2022-05-15 RX ADMIN — DOCUSATE SODIUM 100 MG: 100 CAPSULE, LIQUID FILLED ORAL at 22:00

## 2022-05-15 ASSESSMENT — ENCOUNTER SYMPTOMS
ABDOMINAL PAIN: 0
SORE THROAT: 0
CONSTIPATION: 0
VOMITING: 0
EYE DISCHARGE: 0
EYE REDNESS: 0
RHINORRHEA: 0
COUGH: 0
SHORTNESS OF BREATH: 1
SINUS PRESSURE: 0
DIARRHEA: 0
NAUSEA: 0
CHEST TIGHTNESS: 0
SINUS PAIN: 0

## 2022-05-15 ASSESSMENT — PAIN DESCRIPTION - LOCATION
LOCATION: CHEST
LOCATION: CHEST

## 2022-05-15 ASSESSMENT — PAIN SCALES - GENERAL
PAINLEVEL_OUTOF10: 0
PAINLEVEL_OUTOF10: 0
PAINLEVEL_OUTOF10: 7

## 2022-05-15 ASSESSMENT — PAIN - FUNCTIONAL ASSESSMENT: PAIN_FUNCTIONAL_ASSESSMENT: 0-10

## 2022-05-15 ASSESSMENT — HEART SCORE: ECG: 1

## 2022-05-15 ASSESSMENT — PAIN DESCRIPTION - DESCRIPTORS
DESCRIPTORS: TIGHTNESS
DESCRIPTORS: TIGHTNESS

## 2022-05-15 NOTE — H&P
HOSPITALISTS HISTORY AND PHYSICAL    5/15/2022 7:29 PM    Patient Information:  Suyapa Hatfield is a 66 y.o. male [id number]  PCP:  Israel Porter MD (Tel: [phone] )    Chief complaint:    Chief Complaint   Patient presents with    Chest Pain     strated 1430 today    Shortness of Breath        History of Present Illness:  Marcell Castillo is a 66 y.o. male who presented to the ED onset heavy substernal chest pain with associated dyspnea. Patient reports that he has been having intermittent chest pain for approximately 1 weeks duration. He also endorses decreased exercise capacity secondary to dyspnea ongoing for approximately 2 months. Patient has known CAD and is status post CABG x1 in 1999. He reports that he is compliant with his DAPT, statin, and antihypertensives. Patient has a remote history of heavy tobacco use, but has been a non-smoker for several decades. Upon arrival to the ED EKG was obtained revealing NSR with PVCs, age-indeterminate septal infarct, and T wave abnormalities inferiorly. CXR notable for perihilar lung markings suggestive of pulmonary vascular congestion without overt edema. Notable labs include: Elevated , BUN/CR 26/1.3 with CrCl 53 cc/min. Use ASA as well as Nitropaste application to his chest wall prior to request for admission. History obtained from patient and review of [facility] chart    Old medical records show    Summary   Left ventricular systolic function is normal with an estimated ejection   fraction of 55%. Prominent Eustachian valve is noted. Moderate sized sessile echo density on right atrial free wall of unknown   etiology. Small amount of layered plaque in the aorta. There is no evidence of mass or thrombus in the left atrium or appendage. Redundancy of the interatrial septum. A bubble study was performed and showed no evidence of shunting. Mild mitral regurgitation. REVIEW OF SYSTEMS:   Constitutional: Negative for fever,chills; positive generalized weakness and decreased exercise capacity  ENT: Negative for headache, rhinorrhea, and sore throat. Respiratory: Positive for exertional dyspnea; remote history of heavy tobacco use  Cardiovascular: Positive substernal chest pressure radiating into neck and LUE with associated diaphoresis and dyspepsia  Gastrointestinal: Negative for N/V/D and abdominal pain; no hematemesis, hematochezia, or melena; no anorexia  Genitourinary: Negative for dysuria, frequency, retention; no incontinence  Hematologic/Lymphatic: Negative for bleeding tendency/excessive bruising  Musculoskeletal: Negative for myalgias and arthalgias; able to ambulate without difficulty  Neurologic: Negative for LOC, seizure activity, paresthesias, dysarthria, vertigo, and gait disturbance  Skin: Negative for itching,rash, decubitus  Psychiatric: Negative for depression,anxiety, and agitation; no hallucinations; denies SI/HI  Endocrine: Negative for polyuria/polydipsia/polyphagia; no heat/cold intolerance    Past Medical History:   has a past medical history of BPH (benign prostatic hyperplasia), CAD (coronary artery disease), Chronic back pain, Gastric ulcer, GERD (gastroesophageal reflux disease), Hyperlipidemia, Hypertension, MI (myocardial infarction) ([facility] [address] Osteoarthritis, and Sciatic pain. Past Surgical History:   has a past surgical history that includes Shoulder arthroscopy (05/2010); Coronary artery bypass graft (2012); Spine surgery; Coronary angioplasty with stent; Intracapsular cataract extraction (Right, 10/11/2019); Intracapsular cataract extraction (Left, 10/25/2019); and Tonsillectomy. Medications:  No current facility-administered medications on file prior to encounter.      Current Outpatient Medications on File Prior to Encounter   Medication Sig Dispense Refill    docusate sodium (COLACE) 100 MG capsule Take 100 mg by mouth 2 times daily      valsartan (DIOVAN) 160 MG tablet Take 1 tablet by mouth daily 30 tablet 11    finasteride (PROSCAR) 5 MG tablet TAKE ONE TABLET BY MOUTH ONCE DAILY FOR PROSTATE (IF THIS MEDICATION IS CRUSHED PRIOR TO ADMINISTRATION, IT MUST NOT BE HANDLED BY WOMEN WHO ARE PREGNANT OR WHO MAY BECOME PREGNANT)      fluticasone (FLONASE) 50 MCG/ACT nasal spray USE 2 SPRAYS IN EACH NOSTRIL ONCE DAILY FOR NASAL ALLERGY      diclofenac sodium (VOLTAREN) 1 % GEL APPLY 4 GM (USE DOSING CARD) TOPICALLY FOUR TIMES A DAY AS NEEDED FOR SPINE AND RIGHT HIP JOINT PAIN (UPS GROUND)      famotidine (PEPCID) 20 MG tablet Take 20 mg by mouth daily      Glucosamine-Chondroit-Vit C-Mn (GLUCOSAMINE CHONDR 500 COMPLEX PO) Take by mouth      atenolol (TENORMIN) 50 MG tablet Take 1 tablet by mouth daily 90 tablet 3    ibuprofen (ADVIL;MOTRIN) 800 MG tablet Take 800 mg by mouth nightly      terazosin (HYTRIN) 2 MG capsule Take 5 mg by mouth nightly       vitamin D (CHOLECALCIFEROL) 1000 UNIT TABS tablet Take 1,000 Units by mouth 2 times daily      sildenafil (REVATIO) 20 MG tablet Take 20 mg by mouth as needed      atorvastatin (LIPITOR) 40 MG tablet Take 1 tablet by mouth daily. (Patient taking differently: Take 40 mg by mouth nightly ) 30 tablet 0    aspirin 81 MG tablet Take 81 mg by mouth daily.  nitroGLYCERIN (NITROSTAT) 0.4 MG SL tablet Place 1 tablet under the tongue every 5 minutes as needed. 25 tablet 1    clopidogrel (PLAVIX) 75 MG tablet Take 1 tablet by mouth daily. 90 tablet 3    Multiple Vitamins-Minerals (MULTI COMPLETE PO) Take 1 tablet by mouth daily.  diazepam (VALIUM) 10 MG tablet Take 10 mg by mouth nightly. Allergies: Allergies   Allergen Reactions    Other Other (See Comments)     MSG-  H/A's, dizziness, N/V    Codeine Itching    Vicodin [Hydrocodone-Acetaminophen] Itching        Social History:   reports that he quit smoking about 39 years ago.  He started smoking about 57 years ago. He smoked 3.50 packs per day. He has never used smokeless tobacco. He reports that he does not drink alcohol and does not use drugs. Family History:  family history includes Heart Disease in his brother, brother, brother, father, mother, and sister; High Blood Pressure in his father and mother.      Physical Exam:  BP (!) 153/71   Pulse 62   Temp 98.2 °F (36.8 °C) (Oral)   Resp 23   Ht 5' 6.5\" (1.689 m)   Wt 190 lb (86.2 kg)   SpO2 97%   BMI 30.21 kg/m²     General appearance: WDWN adult male resting comfortably in bed with wife present  Eyes: Sclera clear without conjunctival injection; PERRLA; EOMI  ENT: Mucous membranes moist without thrush; normal dentition  Neck: Supple without meningismus; no goiter; no carotid bruit bilaterally  Cardiovascular: Regular rhythm without ectopy; normal S1-S2 with no murmurs; no peripheral edema; no JVD  Respiratory: No tachypnea; CTAB with diminished air exchange, no wheeze, rhonchi or rales; I:E intact  Gastrointestinal: Abdomen soft, non-tender, not distended; bowel sounds normal; no masses/organomegaly appreciated  Musculoskeletal: FROM spine and extremities x4; no gross deformity  Neurology: A&O x3; cranial nerves 2-12 grossly intact; motor 5/5  BUE/BLE; no seizure activity  Already answered psychiatry: Well-groomed with good eye contact; appropriate affect; no visual/auditory hallucination  Skin: Warm, dry, normal turgor, no rash  PV: 2/4 radial and dorsalis pedis bilaterally; brisk capillary refill    Labs:  CBC:   Lab Results   Component Value Date    WBC 4.0 05/15/2022    RBC 3.94 05/15/2022    HGB 13.1 05/15/2022    HCT 38.1 05/15/2022    MCV 96.9 05/15/2022    MCH 33.2 05/15/2022    MCHC 34.2 05/15/2022    RDW 13.5 05/15/2022     05/15/2022    MPV 7.8 05/15/2022     BMP:    Lab Results   Component Value Date     05/15/2022    K 4.3 05/15/2022    K 4.8 05/13/2021     05/15/2022    CO2 22 05/15/2022    BUN 26 05/15/2022    CREATININE 1.3 05/15/2022    CALCIUM 9.2 05/15/2022    GFRAA >60 05/15/2022    GFRAA >60 03/25/2012    LABGLOM 54 05/15/2022    GLUCOSE 122 05/15/2022     XR CHEST PORTABLE   Final Result   Mild prominence of the perihilar lung markings could represent mild central   vascular congestion however no overt edema or pleural effusion. EKG: Ventricular Rate 65 P BPM QTc Calculation (Bazett) 432 P ms   Atrial Rate 65 P BPM P Axis 32 P degrees   P-R Interval 200 P ms R Axis 14 P degrees   QRS Duration 106 P ms T Axis 268 P degrees   Q-T Interval 416 P ms Diagnosis Sinus rhythm with occasional Premature ventricular complexesSeptal infarct , age undeterminedST & T wave abnormality concerning for inferior ischemia. I visualized CXR images and EKG strips personally and agree with documented interpretation    Discussed case  with ED provider    Problem List:  Principal Problem:    Unstable angina ([address]  Active Problems:    Chest pain, moderate coronary artery risk    Pulmonary vascular congestion    Coronary atherosclerosis of native coronary artery    S/P CABG x 1    HTN (hypertension)  Resolved Problems:    * No resolved hospital problems.  *        Consults:  IP CONSULT TO HOSPITALIST  IP CONSULT TO CARDIOLOGY      Assessment/Plan:     Unstable angina s/p remote CABG  -Admit to telemetry floor with serial cardiac enzymes to be obtained overnight  -Continue on DAPT with EC ASA and Plavix  -High dose statin therapy continued overnight; obtain FLP in a.m.  -Nitropaste applied to chest wall (BP permitting)  -ECHO scheduled for a.m.  -Exercise treadmill vs Lexiscan nuclear stress testing NOT scheduled at this time due to concomitant pulmonary edema; will defer to CARDS  -Therapeutic anticoagulation not indicated at this time  -Consultation placed to cardiologist for further recommendations    Pulmonary edema with exertional dyspnea  -Admit to floor for continuous telemetry monitoring and strict I's & O's  -TSH and free T4 ordered with results pending  -IV Lasix 20 mg initiated Q12 hours  -Continue home doses of Diovan, Tenormin, and DAPT and statin  -ECHO scheduled to further assess cardiac structure and function, including EF  -2G Na and fluid restriction dietary modifications in place  -Consult placed to Cardiology for further recommendations      DVT prophylaxis-Lovenox 40 mg subcu daily  Code status-full code  Diet-cardiac 2 g sodium with fluid restriction  IV access-PIV established in ED      Admit as inpatient. I anticipate hospitalization spanning more than two midnights for investigation and treatment of the above medically necessary diagnoses. Comment: Please note this report has been produced using speech recognition software and may contain errors related to that system including errors in grammar, punctuation, and spelling, as well as words and phrases that may be inappropriate. If there are any questions or concerns please feel free to contact the dictating provider for clarification.          Isaura Bullard MD    5/15/2022 7:29 PM

## 2022-05-15 NOTE — ED PROVIDER NOTES
[address] ENCOUNTER        Pt Name: Salvatore Hastings  MRN: [id number]  [place] 1950  Date of evaluation: 5/15/2022  Provider: Filipe Couch PA-C  PCP: Mercedes Abarca MD  ED Attending: Sarah Rodriguez DO      This patient was seen and evaluated by the attending physician Sarah Rodriguez DO. I have not independently evaluated this patient. CHIEF COMPLAINT       Chief Complaint   Patient presents with    Chest Pain     strated 1430 today    Shortness of Breath       HISTORY OF PRESENT ILLNESS   (Location/Symptom, Timing/Onset, Context/Setting, Quality, Duration, Modifying Factors, Severity)  Note limiting factors. Salvatore Hastings is a 66 y.o. male with a h/o CAD, multiple PCI of RCA, restenosis, CABG, HTN, HLD, cardiomyopathy, for  Sudden onset of \"heavy\" chest pain 7/10 pain, onset 2 hours pta. Associated shortness of breath. No tx prior to arrival.  Patient has a history of a cardiac problems therefore he did not hesitate to come into the department. Patient follows with Dr. Karely Donald. Nursing Notes were all reviewed and agreed with or any disagreements were addressed  in the HPI. REVIEW OF SYSTEMS  (2-9 systems for level 4, 10 or more for level 5)     Review of Systems   Constitutional: Negative for chills and fever. HENT: Negative. Negative for congestion, rhinorrhea, sinus pressure, sinus pain and sore throat. Eyes: Negative for discharge, redness and visual disturbance. Respiratory: Positive for shortness of breath. Negative for cough and chest tightness. Cardiovascular: Positive for chest pain. Negative for palpitations. Gastrointestinal: Negative for abdominal pain, constipation, diarrhea, nausea and vomiting. Genitourinary: Negative for difficulty urinating, dysuria and frequency. Musculoskeletal: Negative. Skin: Negative. Neurological: Negative. Negative for dizziness, weakness, numbness and headaches. Psychiatric/Behavioral: Negative. All other systems reviewed and are negative. Positivesand Pertinent negatives as per HPI. Except as noted above in the ROS, all other systems were reviewed and negative.        PAST MEDICAL HISTORY     Past Medical History:   Diagnosis Date    BPH (benign prostatic hyperplasia)     CAD (coronary artery disease)     Chronic back pain     Gastric ulcer     past hx    GERD (gastroesophageal reflux disease)     Hyperlipidemia     Hypertension     MI (myocardial infarction) ([facility] [address] 06/1999    Osteoarthritis     Sciatic pain          SURGICAL HISTORY       Past Surgical History:   Procedure Laterality Date    CORONARY ANGIOPLASTY WITH STENT PLACEMENT      Total of 14 stents [id number]    CORONARY ARTERY BYPASS GRAFT  2012    INTRACAPSULAR CATARACT EXTRACTION Right 10/11/2019    PHACOEMULSIFICATION OF CATARACT RIGHT EYE WITH INTRAOCULAR LENS IMPLANT performed by Mayur Coronel MD at [facility] Left 10/25/2019    PHACOEMULSIFICATION OF CATARACT LEFT EYE WITH INTRAOCULAR LENS IMPLANT performed by Mayur Coronel MD at [place] [address] ARTHROSCOPY  05/2010    SPINE SURGERY      spinal fusion-lower back    TONSILLECTOMY           CURRENT MEDICATIONS       Current Discharge Medication List      CONTINUE these medications which have NOT CHANGED    Details   docusate sodium (COLACE) 100 MG capsule Take 100 mg by mouth 2 times daily      valsartan (DIOVAN) 160 MG tablet Take 1 tablet by mouth daily  Qty: 30 tablet, Refills: 11      finasteride (PROSCAR) 5 MG tablet TAKE ONE TABLET BY MOUTH ONCE DAILY FOR PROSTATE (IF THIS MEDICATION IS CRUSHED PRIOR TO ADMINISTRATION, IT MUST NOT BE HANDLED BY WOMEN WHO ARE PREGNANT OR WHO MAY BECOME PREGNANT)      fluticasone (FLONASE) 50 MCG/ACT nasal spray USE 2 SPRAYS IN EACH NOSTRIL ONCE DAILY FOR NASAL ALLERGY      diclofenac sodium (VOLTAREN) 1 % GEL APPLY 4 GM (USE DOSING CARD) TOPICALLY FOUR TIMES A DAY AS NEEDED FOR SPINE AND RIGHT HIP JOINT PAIN (UPS GROUND)      famotidine (PEPCID) 20 MG tablet Take 20 mg by mouth daily      Glucosamine-Chondroit-Vit C-Mn (GLUCOSAMINE CHONDR 500 COMPLEX PO) Take by mouth      atenolol (TENORMIN) 50 MG tablet Take 1 tablet by mouth daily  Qty: 90 tablet, Refills: 3      ibuprofen (ADVIL;MOTRIN) 800 MG tablet Take 800 mg by mouth nightly      terazosin (HYTRIN) 2 MG capsule Take 5 mg by mouth nightly       vitamin D (CHOLECALCIFEROL) 1000 UNIT TABS tablet Take 1,000 Units by mouth 2 times daily      sildenafil (REVATIO) 20 MG tablet Take 20 mg by mouth as needed      atorvastatin (LIPITOR) 40 MG tablet Take 1 tablet by mouth daily. Qty: 30 tablet, Refills: 0      aspirin 81 MG tablet Take 81 mg by mouth daily. nitroGLYCERIN (NITROSTAT) 0.4 MG SL tablet Place 1 tablet under the tongue every 5 minutes as needed. Qty: 25 tablet, Refills: 1      clopidogrel (PLAVIX) 75 MG tablet Take 1 tablet by mouth daily. Qty: 90 tablet, Refills: 3      Multiple Vitamins-Minerals (MULTI COMPLETE PO) Take 1 tablet by mouth daily. diazepam (VALIUM) 10 MG tablet Take 10 mg by mouth nightly.                 ALLERGIES     Other, Codeine, and Vicodin [hydrocodone-acetaminophen]    FAMILY HISTORY       Family History   Problem Relation Age of Onset    Heart Disease Mother     High Blood Pressure Mother     Heart Disease Father     High Blood Pressure Father     Heart Disease Brother     Heart Disease Brother     Heart Disease Brother     Heart Disease Sister          SOCIAL HISTORY       Social History     Socioeconomic History    Marital status:      Spouse name: None    Number of children: None    Years of education: None    Highest education level: None   Occupational History    None   Tobacco Use    Smoking status: Former Smoker     Packs/day: 3.50     Start date: 1964     Quit date: 1983     Years since quittin.2    Smokeless tobacco: Never Used   Vaping Use    Vaping Use: Never used   Substance and Sexual Activity    Alcohol use: No     Comment: Few drinks a year    Drug use: No    Sexual activity: Yes     Partners: Female   Other Topics Concern    None   Social History Narrative    None     Social Determinants of Health     Financial Resource Strain:     Difficulty of Paying Living Expenses: Not on file   Food Insecurity:     Worried About Running Out of Food in the Last Year: Not on file    [place] of Food in the Last Year: Not on file   Transportation Needs:     Lack of Transportation (Medical): Not on file    Lack of Transportation (Non-Medical):  Not on file   Physical Activity:     Days of Exercise per Week: Not on file    Minutes of Exercise per Session: Not on file   Stress:     Feeling of Stress : Not on file   Social Connections:     Frequency of Communication with Friends and Family: Not on file    Frequency of Social Gatherings with Friends and Family: Not on file    Attends [employer] Services: Not on file    Active Member of [address] [place] [address] or Organizations: Not on file    Attends Club or Organization Meetings: Not on file    Marital Status: Not on file   Intimate Partner Violence:     Fear of Current or Ex-Partner: Not on file    Emotionally Abused: Not on file    Physically Abused: Not on file    Sexually Abused: Not on file   Housing Stability:     Unable to Pay for Housing in the Last Year: Not on file    Number of [place] in the Last Year: Not on file    Unstable Housing in the Last Year: Not on file       SCREENINGS     NIH Score       [facility] [place] Coma Scale  Eye Opening: Spontaneous  Best Verbal Response: Oriented  Best Motor Response: Obeys commands  [place] Coma Scale Score: 15    [facility]     Heart Score  Heart Score for chest pain patients  History: Highly Suspicious  ECG: Non-Specifc repolarization disturbance/LBTB/PM  Patient Age: > 65 years  *Risk factors for Atherosclerotic disease: Hypercholesterolemia,Hypertension,Coronary Artery Disease  Risk Factors: > 3 Risk factors or history of atherosclerotic disease*  Troponin: < 1X normal limit  Heart Score Total: 7      PHYSICAL EXAM    (up to 7 for level 4, 8 ormore for level 5)     ED Triage Vitals   BP Temp Temp Source Pulse Resp SpO2 Height Weight   05/15/22 1639 05/15/22 1639 05/15/22 1639 05/15/22 1639 05/15/22 1639 05/15/22 1639 05/15/22 1636 05/15/22 1636   (!) 140/82 98.2 °F (36.8 °C) Oral 61 18 98 % 5' 6.5\" (1.689 m) 190 lb (86.2 kg)       Physical Exam  Vitals and nursing note reviewed. Constitutional:       Appearance: He is well-developed. He is obese. He is not diaphoretic. HENT:      Head: Normocephalic. Nose: Nose normal.      Mouth/Throat:      Pharynx: No oropharyngeal exudate. Eyes:      General:         Right eye: No discharge. Left eye: No discharge. Conjunctiva/sclera: Conjunctivae normal.      Pupils: Pupils are equal, round, and reactive to light. Cardiovascular:      Rate and Rhythm: Normal rate and regular rhythm. Heart sounds: Normal heart sounds. No murmur heard. No friction rub. No gallop. Pulmonary:      Effort: Pulmonary effort is normal. No respiratory distress. Breath sounds: Normal breath sounds. No wheezing. Abdominal:      General: Bowel sounds are normal. There is no distension. Palpations: Abdomen is soft. Tenderness: There is no abdominal tenderness. Musculoskeletal:         General: Normal range of motion. Cervical back: Normal range of motion. Skin:     General: Skin is warm and dry. Neurological:      Mental Status: He is alert.    Psychiatric:         Behavior: Behavior normal.         DIAGNOSTIC RESULTS   LABS:    Labs Reviewed   CBC WITH AUTO DIFFERENTIAL - Abnormal; Notable for the following components:       Result Value    RBC 3.94 (*)     Hemoglobin 13.1 (*)     Hematocrit 38.1 (*)     All other components within normal limits COMPREHENSIVE METABOLIC PANEL - Abnormal; Notable for the following components:    Glucose 122 (*)     BUN 26 (*)     GFR Non- 54 (*)     All other components within normal limits   BRAIN NATRIURETIC PEPTIDE - Abnormal; Notable for the following components:    Pro- (*)     All other components within normal limits   D-DIMER, QUANTITATIVE - Abnormal; Notable for the following components:    D-Dimer, Quant 280 (*)     All other components within normal limits   TROPONIN   TROPONIN   COMPREHENSIVE METABOLIC PANEL W/ REFLEX TO MG FOR LOW K   HEMOGLOBIN A1C   TROPONIN   LIPID PANEL   CBC   PROTIME-INR   APTT   BLOOD OCCULT STOOL SCREEN #1       All other labs were within normal range or notreturned as of this dictation. EKG: All EKG's are interpreted by the Emergency Department Physician who either signs or Co-signs this chart in the absence of a cardiologist.  Please see their note for interpretation of EKG. RADIOLOGY:   Interpretation per the Radiologist below, if available at the time of this note:    XR CHEST PORTABLE   Final Result   Mild prominence of the perihilar lung markings could represent mild central   vascular congestion however no overt edema or pleural effusion. CRITICAL CARE TIME   Total critical care time today provided was 15 minutes. This excludes seperately billable procedures and family discussion time. Provided for acute chest pain requiring intervention with concern for potential decompensation.       CONSULTS:  IP CONSULT TO HOSPITALIST  IP CONSULT TO CARDIOLOGY      EMERGENCY DEPARTMENT COURSE and DIFFERENTIAL DIAGNOSIS/MDM:   Vitals:    Vitals:    05/15/22 1924 05/15/22 1954 05/15/22 2025 05/15/22 2118   BP: (!) 163/76 (!) 167/69 (!) 163/73 (!) 175/69   Pulse: 62 70 61 56   Resp: 24 22 25 20   Temp:    98.1 °F (36.7 °C)   TempSrc:    Oral   SpO2: 97% 98% 98% 99%   Weight:       Height:           Patient was given the following medications:  Medications   atenolol (TENORMIN) tablet 50 mg (has no administration in time range)   atorvastatin (LIPITOR) tablet 40 mg (40 mg Oral Given 5/15/22 2321)   clopidogrel (PLAVIX) tablet 75 mg (has no administration in time range)   diazePAM (VALIUM) tablet 5 mg (5 mg Oral Given 5/15/22 2321)   docusate sodium (COLACE) capsule 100 mg (100 mg Oral Given 5/15/22 2200)   famotidine (PEPCID) tablet 20 mg (has no administration in time range)   finasteride (PROSCAR) tablet 5 mg (has no administration in time range)   fluticasone (FLONASE) 50 MCG/ACT nasal spray 2 spray (has no administration in time range)   Multi Complete CAPS 1 tablet (has no administration in time range)   valsartan (DIOVAN) tablet 160 mg (has no administration in time range)   vitamin D (CHOLECALCIFEROL) tablet 1,000 Units (1,000 Units Oral Given 5/15/22 2321)   doxazosin (CARDURA) tablet 2 mg (has no administration in time range)   perflutren lipid microspheres (DEFINITY) injection 1.65 mg (has no administration in time range)   sodium chloride flush 0.9 % injection 10 mL (10 mLs IntraVENous Given 5/15/22 2224)   sodium chloride flush 0.9 % injection 10 mL (has no administration in time range)   0.9 % sodium chloride infusion (has no administration in time range)   potassium chloride (KLOR-CON M) extended release tablet 40 mEq (has no administration in time range)     Or   potassium bicarb-citric acid (EFFER-K) effervescent tablet 40 mEq (has no administration in time range)     Or   potassium chloride 10 mEq/100 mL IVPB (Peripheral Line) (has no administration in time range)   magnesium sulfate 2000 mg in 50 mL IVPB premix (has no administration in time range)   acetaminophen (TYLENOL) tablet 650 mg (has no administration in time range)     Or   acetaminophen (TYLENOL) suppository 650 mg (has no administration in time range)   senna (SENOKOT) tablet 8.6 mg (has no administration in time range)   polyethylene glycol (GLYCOLAX) packet 17 g (has no administration in time range)   aspirin EC tablet 81 mg (has no administration in time range)   enoxaparin (LOVENOX) injection 40 mg (has no administration in time range)   nitroglycerin (NITRO-BID) 2 % ointment 0.5 inch (0.5 inches Topical Not Given 5/15/22 2148)   ondansetron (ZOFRAN-ODT) disintegrating tablet 4 mg (has no administration in time range)     Or   ondansetron (ZOFRAN) injection 4 mg (has no administration in time range)   furosemide (LASIX) injection 20 mg (20 mg IntraVENous Given 5/15/22 2200)   aspirin chewable tablet 162 mg (162 mg Oral Given 5/15/22 1708)   nitroglycerin (NITRO-BID) 2 % ointment 1 inch (1 inch Topical Given 5/15/22 1708)         Afebrile, stable, patient presents to the ED for evaluation. Seen in conjunction with attending ED provider who agrees with assessment and plan. Nontoxic patient SPO2 on room air of 98% hypertensive on initial presentation. Provided with nitro and aspirin. Nitro improves patient's pain from a 7 to a 0. And evaluated patient's past cardiac history EKG with changes. Sinus rhythm with PVCs. Chest x-ray shows prominence of the lung markings with central vascular congestion no acute process overt edema or pleural effusion. Labs returned revealing no elevation in initial troponin. Mild anemia with a hemoglobin of 13.1 hematocrit of 38.1. Glucose of 122 bun mildly elevated at 26 GFR of 54. Patient's heart score is 7 and his pain is improved after receiving the nitro feel he would benefit from admission for serial troponins and cardiologist evaluation. Steve this with the patient who is in agreements with admission to the hospital, Consult to the hospitalist for admission. All questions are answered. The patient tolerated their visit well. They were seen and evaluated by the Temo Page DO who agreed with the assessment and plan.   The patient and / or the family were informed of the results of any tests, a time was given to answer questions, a plan was proposed and they agreed withplan. FINAL IMPRESSION      1. Chest pain, unspecified type          DISPOSITION/PLAN   DISPOSITION Admitted 05/15/2022 07:28:03 PM           Pt was seen during the [place] 19 pandemic. Appropriate PPE worn by ME during patient encounters. Pt seen during a time with constrained hospital bed capacity and other potential inpatient and outpatient resources were constrained due to the viral pandemic.    Please note that portions of this note were completed with a voice recognition program.  Efforts were made to edit the dictations but occasionally words are mis-transcribed.)    Ce Talley PA-C (electronically signed)        Ce Talley PA-C  05/16/22 1619

## 2022-05-15 NOTE — ED PROVIDER NOTES
I independently performed a history and physical on Geena Morris. All diagnostic, treatment, and disposition decisions were made by myself in conjunction with the advanced practice provider. For further details of German Ignacio's emergency department encounter, please see ISHMAEL Mock's documentation. The substantive of portion of the MDM was performed by myself        Patient with history of CAD status post CABG and stents remotely presents to the emergency department complaining of chest pain. Symptoms began at approximately 230 today. Patient reports substernal chest pain without radiation. No alleviating or exacerbating factors pain is rated as 7/10 upon arrival.  Patient also reports moderate shortness of breath noted diaphoresis. Physical exam: General: No acute distress. Head: Normocephalic/atraumatic. Heart: Regular in rhythm. Normal S1-S2. Lungs: Clear to auscultation bilaterally. No wheezes, rales, rhonchi. Abdomen: Soft. Nontender, nondistended. Rebound, guarding. Extremities: No swelling or edema. EKG: Sinus rhythm with occasional PVC. Rate of 65. Normal axis. Normal intervals and durations. QTc within normal limits. Lateral ST changes noted.   These appear to be new from previous EKG on 10/3/2019.          1. Chest pain, unspecified type           Sarah Rodriguez DO  05/15/22 8818

## 2022-05-16 ENCOUNTER — APPOINTMENT (OUTPATIENT)
Dept: CARDIAC CATH/INVASIVE PROCEDURES | Age: 72
DRG: 286 | End: 2022-05-16
Payer: MEDICARE

## 2022-05-16 LAB
A/G RATIO: 2 (ref 1.1–2.2)
ALBUMIN SERPL-MCNC: 4.5 G/DL (ref 3.4–5)
ALP BLD-CCNC: 44 U/L (ref 40–129)
ALT SERPL-CCNC: 31 U/L (ref 10–40)
ANION GAP SERPL CALCULATED.3IONS-SCNC: 11 MMOL/L (ref 3–16)
APTT: 33.4 SEC (ref 26.2–38.6)
AST SERPL-CCNC: 24 U/L (ref 15–37)
BILIRUB SERPL-MCNC: 0.5 MG/DL (ref 0–1)
BUN BLDV-MCNC: 25 MG/DL (ref 7–20)
CALCIUM SERPL-MCNC: 9.4 MG/DL (ref 8.3–10.6)
CHLORIDE BLD-SCNC: 107 MMOL/L (ref 99–110)
CHOLESTEROL, TOTAL: 146 MG/DL (ref 0–199)
CO2: 22 MMOL/L (ref 21–32)
CREAT SERPL-MCNC: 1.3 MG/DL (ref 0.8–1.3)
EKG ATRIAL RATE: 65 BPM
EKG DIAGNOSIS: NORMAL
EKG P AXIS: 32 DEGREES
EKG P-R INTERVAL: 200 MS
EKG Q-T INTERVAL: 416 MS
EKG QRS DURATION: 106 MS
EKG QTC CALCULATION (BAZETT): 432 MS
EKG R AXIS: 14 DEGREES
EKG T AXIS: 268 DEGREES
EKG VENTRICULAR RATE: 65 BPM
GFR AFRICAN AMERICAN: >60
GFR NON-AFRICAN AMERICAN: 54
GLUCOSE BLD-MCNC: 111 MG/DL (ref 70–99)
HCT VFR BLD CALC: 40.5 % (ref 40.5–52.5)
HDLC SERPL-MCNC: 53 MG/DL (ref 40–60)
HEMOGLOBIN: 13.7 G/DL (ref 13.5–17.5)
INR BLD: 1.14 (ref 0.88–1.12)
LDL CHOLESTEROL CALCULATED: 68 MG/DL
LV EF: 58 %
LVEF MODALITY: NORMAL
MCH RBC QN AUTO: 32.9 PG (ref 26–34)
MCHC RBC AUTO-ENTMCNC: 33.8 G/DL (ref 31–36)
MCV RBC AUTO: 97.4 FL (ref 80–100)
PDW BLD-RTO: 13.5 % (ref 12.4–15.4)
PLATELET # BLD: 146 K/UL (ref 135–450)
PMV BLD AUTO: 7.7 FL (ref 5–10.5)
POTASSIUM REFLEX MAGNESIUM: 4 MMOL/L (ref 3.5–5.1)
PROTHROMBIN TIME: 13 SEC (ref 9.9–12.7)
RBC # BLD: 4.15 M/UL (ref 4.2–5.9)
SODIUM BLD-SCNC: 140 MMOL/L (ref 136–145)
TOTAL PROTEIN: 6.8 G/DL (ref 6.4–8.2)
TRIGL SERPL-MCNC: 124 MG/DL (ref 0–150)
TROPONIN: <0.01 NG/ML
VLDLC SERPL CALC-MCNC: 25 MG/DL
WBC # BLD: 5.7 K/UL (ref 4–11)

## 2022-05-16 PROCEDURE — 2580000003 HC RX 258: Performed by: INTERNAL MEDICINE

## 2022-05-16 PROCEDURE — C1887 CATHETER, GUIDING: HCPCS

## 2022-05-16 PROCEDURE — C1769 GUIDE WIRE: HCPCS

## 2022-05-16 PROCEDURE — 93005 ELECTROCARDIOGRAM TRACING: CPT | Performed by: HOSPITALIST

## 2022-05-16 PROCEDURE — 93010 ELECTROCARDIOGRAM REPORT: CPT | Performed by: INTERNAL MEDICINE

## 2022-05-16 PROCEDURE — 85027 COMPLETE CBC AUTOMATED: CPT

## 2022-05-16 PROCEDURE — 83036 HEMOGLOBIN GLYCOSYLATED A1C: CPT

## 2022-05-16 PROCEDURE — B2111ZZ FLUOROSCOPY OF MULTIPLE CORONARY ARTERIES USING LOW OSMOLAR CONTRAST: ICD-10-PCS | Performed by: INTERNAL MEDICINE

## 2022-05-16 PROCEDURE — 6360000002 HC RX W HCPCS: Performed by: HOSPITALIST

## 2022-05-16 PROCEDURE — 85730 THROMBOPLASTIN TIME PARTIAL: CPT

## 2022-05-16 PROCEDURE — 80061 LIPID PANEL: CPT

## 2022-05-16 PROCEDURE — 99152 MOD SED SAME PHYS/QHP 5/>YRS: CPT | Performed by: INTERNAL MEDICINE

## 2022-05-16 PROCEDURE — 80053 COMPREHEN METABOLIC PANEL: CPT

## 2022-05-16 PROCEDURE — 2500000003 HC RX 250 WO HCPCS

## 2022-05-16 PROCEDURE — 36415 COLL VENOUS BLD VENIPUNCTURE: CPT

## 2022-05-16 PROCEDURE — 85610 PROTHROMBIN TIME: CPT

## 2022-05-16 PROCEDURE — 93459 L HRT ART/GRFT ANGIO: CPT

## 2022-05-16 PROCEDURE — 4A023N7 MEASUREMENT OF CARDIAC SAMPLING AND PRESSURE, LEFT HEART, PERCUTANEOUS APPROACH: ICD-10-PCS | Performed by: INTERNAL MEDICINE

## 2022-05-16 PROCEDURE — B2151ZZ FLUOROSCOPY OF LEFT HEART USING LOW OSMOLAR CONTRAST: ICD-10-PCS | Performed by: INTERNAL MEDICINE

## 2022-05-16 PROCEDURE — 6360000002 HC RX W HCPCS

## 2022-05-16 PROCEDURE — 84484 ASSAY OF TROPONIN QUANT: CPT

## 2022-05-16 PROCEDURE — 99223 1ST HOSP IP/OBS HIGH 75: CPT | Performed by: INTERNAL MEDICINE

## 2022-05-16 PROCEDURE — 6370000000 HC RX 637 (ALT 250 FOR IP): Performed by: HOSPITALIST

## 2022-05-16 PROCEDURE — 93460 R&L HRT ART/VENTRICLE ANGIO: CPT | Performed by: INTERNAL MEDICINE

## 2022-05-16 PROCEDURE — C1894 INTRO/SHEATH, NON-LASER: HCPCS

## 2022-05-16 PROCEDURE — 1200000000 HC SEMI PRIVATE

## 2022-05-16 PROCEDURE — 2709999900 HC NON-CHARGEABLE SUPPLY

## 2022-05-16 PROCEDURE — 6370000000 HC RX 637 (ALT 250 FOR IP): Performed by: INTERNAL MEDICINE

## 2022-05-16 PROCEDURE — 6360000002 HC RX W HCPCS: Performed by: INTERNAL MEDICINE

## 2022-05-16 RX ORDER — SODIUM CHLORIDE 9 MG/ML
INJECTION, SOLUTION INTRAVENOUS CONTINUOUS
Status: ACTIVE | OUTPATIENT
Start: 2022-05-16 | End: 2022-05-16

## 2022-05-16 RX ORDER — HYDRALAZINE HYDROCHLORIDE 20 MG/ML
10 INJECTION INTRAMUSCULAR; INTRAVENOUS EVERY 10 MIN PRN
Status: DISCONTINUED | OUTPATIENT
Start: 2022-05-16 | End: 2022-05-17 | Stop reason: HOSPADM

## 2022-05-16 RX ORDER — SODIUM CHLORIDE 0.9 % (FLUSH) 0.9 %
5-40 SYRINGE (ML) INJECTION EVERY 12 HOURS SCHEDULED
Status: DISCONTINUED | OUTPATIENT
Start: 2022-05-16 | End: 2022-05-17 | Stop reason: HOSPADM

## 2022-05-16 RX ORDER — FENTANYL CITRATE 50 UG/ML
INJECTION, SOLUTION INTRAMUSCULAR; INTRAVENOUS
Status: COMPLETED | OUTPATIENT
Start: 2022-05-16 | End: 2022-05-16

## 2022-05-16 RX ORDER — MIDAZOLAM HYDROCHLORIDE 1 MG/ML
INJECTION INTRAMUSCULAR; INTRAVENOUS
Status: COMPLETED | OUTPATIENT
Start: 2022-05-16 | End: 2022-05-16

## 2022-05-16 RX ORDER — SODIUM CHLORIDE 0.9 % (FLUSH) 0.9 %
5-40 SYRINGE (ML) INJECTION PRN
Status: DISCONTINUED | OUTPATIENT
Start: 2022-05-16 | End: 2022-05-17 | Stop reason: HOSPADM

## 2022-05-16 RX ORDER — SODIUM CHLORIDE 9 MG/ML
INJECTION, SOLUTION INTRAVENOUS PRN
Status: DISCONTINUED | OUTPATIENT
Start: 2022-05-16 | End: 2022-05-17 | Stop reason: HOSPADM

## 2022-05-16 RX ADMIN — FENTANYL CITRATE 50 MCG: 50 INJECTION, SOLUTION INTRAMUSCULAR; INTRAVENOUS at 12:25

## 2022-05-16 RX ADMIN — FINASTERIDE 5 MG: 5 TABLET, FILM COATED ORAL at 10:49

## 2022-05-16 RX ADMIN — DIAZEPAM 5 MG: 5 TABLET ORAL at 20:58

## 2022-05-16 RX ADMIN — Medication 1000 UNITS: at 20:58

## 2022-05-16 RX ADMIN — Medication 1000 UNITS: at 10:49

## 2022-05-16 RX ADMIN — ACETAMINOPHEN 650 MG: 325 TABLET ORAL at 07:51

## 2022-05-16 RX ADMIN — ATORVASTATIN CALCIUM 40 MG: 40 TABLET, FILM COATED ORAL at 20:58

## 2022-05-16 RX ADMIN — ATENOLOL 50 MG: 25 TABLET ORAL at 10:48

## 2022-05-16 RX ADMIN — DOXAZOSIN 2 MG: 2 TABLET ORAL at 10:48

## 2022-05-16 RX ADMIN — CLOPIDOGREL BISULFATE 75 MG: 75 TABLET ORAL at 10:48

## 2022-05-16 RX ADMIN — ASPIRIN 81 MG: 81 TABLET, COATED ORAL at 10:49

## 2022-05-16 RX ADMIN — VALSARTAN 160 MG: 80 TABLET, FILM COATED ORAL at 10:57

## 2022-05-16 RX ADMIN — ENOXAPARIN SODIUM 40 MG: 100 INJECTION SUBCUTANEOUS at 18:36

## 2022-05-16 RX ADMIN — SODIUM CHLORIDE, PRESERVATIVE FREE 10 ML: 5 INJECTION INTRAVENOUS at 21:16

## 2022-05-16 RX ADMIN — FUROSEMIDE 20 MG: 10 INJECTION, SOLUTION INTRAMUSCULAR; INTRAVENOUS at 09:29

## 2022-05-16 RX ADMIN — MIDAZOLAM HYDROCHLORIDE 1 MG: 1 INJECTION INTRAMUSCULAR; INTRAVENOUS at 12:26

## 2022-05-16 RX ADMIN — DOCUSATE SODIUM 100 MG: 100 CAPSULE, LIQUID FILLED ORAL at 10:49

## 2022-05-16 RX ADMIN — MIDAZOLAM HYDROCHLORIDE 1 MG: 1 INJECTION INTRAMUSCULAR; INTRAVENOUS at 12:34

## 2022-05-16 RX ADMIN — FAMOTIDINE 20 MG: 20 TABLET, FILM COATED ORAL at 10:48

## 2022-05-16 RX ADMIN — SODIUM CHLORIDE: 9 INJECTION, SOLUTION INTRAVENOUS at 13:14

## 2022-05-16 RX ADMIN — DOCUSATE SODIUM 100 MG: 100 CAPSULE, LIQUID FILLED ORAL at 20:58

## 2022-05-16 RX ADMIN — FLUTICASONE PROPIONATE 2 SPRAY: 50 SPRAY, METERED NASAL at 10:49

## 2022-05-16 RX ADMIN — NITROGLYCERIN 0.5 INCH: 20 OINTMENT TOPICAL at 04:12

## 2022-05-16 ASSESSMENT — PAIN SCALES - GENERAL
PAINLEVEL_OUTOF10: 0
PAINLEVEL_OUTOF10: 0
PAINLEVEL_OUTOF10: 8
PAINLEVEL_OUTOF10: 0
PAINLEVEL_OUTOF10: 0

## 2022-05-16 ASSESSMENT — PAIN DESCRIPTION - LOCATION: LOCATION: HEAD

## 2022-05-16 NOTE — PLAN OF CARE
Problem: Safety - Adult  Goal: Free from fall injury  Outcome: Progressing   Pt will remain free from falls throughout hospital stay. Fall precautions in place, bed alarm not on due to pt independent, bed in lowest position with wheels locked and side rails 2/4 up. Room door open and hourly rounding completed. Will continue to monitor throughout shift.

## 2022-05-16 NOTE — PROGRESS NOTES

## 2022-05-16 NOTE — PROGRESS NOTES
NP paged. \"FYI. [facility] called and stated pt had 19 beats of V tach. VS are as follows: /81, Pulse 53, Temp 97.9 and oxygen 97% on room air. Pt is asymptomatic at this time. Extensive h/o cardiac issues including CABG and stents. Will get an ECHO this morning and cardiology is already on board for him. Thanks! \"

## 2022-05-16 NOTE — CARE COORDINATION
CASE MANAGEMENT INITIAL ASSESSMENT      Reviewed chart and completed assessment with patient:at bedside  Family present: yes, mother in law  Explained Case Management role/services. yes    Primary contact information:see below    Health Care Decision Maker :   Primary Decision Maker: Marcelo Kendrick - Spouse - [phone]    Secondary Decision Maker: Yogi Ignacio - Child - [phone]          Can this person be reached and be able to respond quickly, such as within a few minutes or hours? Yes    Admit date/status:05/15/2022  Diagnosis:chest pain   Is this a Readmission?:  No      Insurance:[facility] Medicare   Precert required for SNF: Yes       3 night stay required: No    Living arrangements, Adls, care needs, prior to admission:Pt lives in a 2 story home w/2STE, first floor living. IPTA, drives    Durable Medical Equipment at home:  none  Services in the home and/or outpatient, prior to admission:none    Current Bard Niecy MD                             Medications:no concerns Prescription coverage? Yes Will pt require financial assistance with medications No     Transportation needs: spouse will transport       PT/OT recs:none    [address] Notification (HEN):not initiated    Barriers to discharge:none    Plan/comments:Pt a/o, able to ambulate, has PCP and insurance. Pt has no DCP needs at this time. Should needs arise, please contact DCP.  Nya Jha RN         ECOC on chart for MD signature

## 2022-05-16 NOTE — CONSULTS
Consult placed    [phone] cardiology  Date:5/16/2022,  Time:8:07 AM        Electronically signed by Colonel Munoz on 5/16/2022 at 8:07 AM

## 2022-05-16 NOTE — PROGRESS NOTES
Hospitalist Progress Note      PCP: Rodney Vallejo MD    Date of Admission: 5/15/2022    Chief Complaint: Chest pain    Hospital Course: Patient presented with acute recurrent and severe substernal chest pain and chest discomfort. Evaluated by cardiology. Cardiac cath is being planned for later today. Subjective: Mild chest pain from time to time. No abdominal pain, no nausea, no vomiting.       Medications:  Reviewed    Infusion Medications    sodium chloride 75 mL/hr at 05/16/22 1314    sodium chloride      sodium chloride       Scheduled Medications    sodium chloride flush  5-40 mL IntraVENous 2 times per day    atenolol  50 mg Oral Daily    atorvastatin  40 mg Oral Nightly    clopidogrel  75 mg Oral Daily    docusate sodium  100 mg Oral BID    famotidine  20 mg Oral Daily    finasteride  5 mg Oral Daily    fluticasone  2 spray Each Nostril Daily    therapeutic multivitamin-minerals  1 tablet Oral Daily    valsartan  160 mg Oral Daily    Vitamin D  1,000 Units Oral BID    doxazosin  2 mg Oral Daily    sodium chloride flush  10 mL IntraVENous 2 times per day    aspirin  81 mg Oral Daily    enoxaparin  40 mg SubCUTAneous QPM    nitroglycerin  0.5 inch Topical 3 times per day    furosemide  20 mg IntraVENous BID     PRN Meds: sodium chloride flush, sodium chloride, hydrALAZINE, diazePAM, perflutren lipid microspheres, sodium chloride flush, sodium chloride, potassium chloride **OR** potassium alternative oral replacement **OR** potassium chloride, magnesium sulfate, acetaminophen **OR** acetaminophen, senna, polyethylene glycol, ondansetron **OR** ondansetron      Intake/Output Summary (Last 24 hours) at 5/16/2022 1316  Last data filed at 5/16/2022 0407  Gross per 24 hour   Intake 120 ml   Output 2350 ml   Net -2230 ml       Physical Exam Performed:    BP 97/63   Pulse (!) 49   Temp 97.8 °F (36.6 °C) (Oral)   Resp 18   Ht 5' 6.5\" (1.689 m)   Wt 198 lb 1.6 oz (89.9 kg)   SpO2 92% Assessment/Plan:    Active Hospital Problems    Diagnosis     S/P CABG x 1 [Z95.1]      Priority: High    Coronary atherosclerosis of native coronary artery [I25.10]      Priority: High    Chest pain, moderate coronary artery risk [R07.9]      Priority: Medium    Pulmonary vascular congestion [R09.89]      Priority: Medium    HTN (hypertension) [I10]     Unstable angina ([address] [I20.0]      PLAN:    Chest pain  Unstable angina most likely. Coronary angiogram later today. Being followed by cardiology    Hypertension  Stable blood pressure. Continue same management. Coronary artery disease  Previously known. Had coronary artery disease in the past.  Continue aspirin and statin. Patient's medical management may change following coronary angiogram which is planned for later today. Pulmonary edema  Most likely a consequence of unstable angina. Stable, on room air. Defer diuresis to cardiology    Discussed with the patient. Questions answered    DVT Prophylaxis: Lovenox  Diet: ADULT DIET;  Regular  Code Status: Full Code    PT/OT Eval Status: Not needed    Dispo -inpatient stay pending cardiology recommendations    Eileen Galeano MD

## 2022-05-16 NOTE — CONSULTS
[facility]   Cardiovascular Evaluation    PATIENT: Bianca Darden  DATE: 2022  MRN: [id number]  CSN: [id number]  : 1950    Primary Care Doctor/Referring provider: Juan Riley MD, Giselle Grande MD     Reason for evaluation/Chief complaint:   Chest Pain (strated 1430 today) and Shortness of Breath      Subjective:    History of present illness on initial date of evaluation:   Bianca Darden is a 66 y.o. patient who presents for evaluation of fatigue, shortness of breath and chest pain. Patient states that he had noted several weeks of increasing exercise intolerance associated with shortness of breath and exhaustion. Patient states that these symptoms prevented him from completing his normal patterns of work and was limiting his activities of daily living. He states this weekend he experienced worsening symptoms with the development of anterior chest tightness. He states that the symptom complex of chest tightness was similar to his previous events during which she had treatment for coronary artery disease. This symptom complex prompted him to seek medical evaluation. Patient was seen and evaluated in the emergency room and admitted to the hospital.  Cardiology's been asked see the patient for further recommendations and management. Patient has not had any chest pain at rest during his hospitalization. He states that he still feels limited. .        Patient Active Problem List   Diagnosis    Coronary atherosclerosis of native coronary artery    Other and unspecified hyperlipidemia    S/P CABG x 1    Unstable angina (HCC)    HTN (hypertension)    Right atrial mass    Chest pain, moderate coronary artery risk    Pulmonary vascular congestion         Cardiac Testing: I have reviewed the findings below.   EKG:  ECHO:   STRESS TEST:  CATH:  BYPASS:  VASCULAR:    Past Medical History:   has a past medical history of BPH (benign prostatic hyperplasia), CAD (coronary artery disease), Chronic back pain, Gastric ulcer, GERD (gastroesophageal reflux disease), Hyperlipidemia, Hypertension, MI (myocardial infarction) ([facility] [address] Osteoarthritis, and Sciatic pain. Surgical History:   has a past surgical history that includes Shoulder arthroscopy (05/2010); Coronary artery bypass graft (2012); Spine surgery; Coronary angioplasty with stent; Intracapsular cataract extraction (Right, 10/11/2019); Intracapsular cataract extraction (Left, 10/25/2019); and Tonsillectomy. Social History:   reports that he quit smoking about 39 years ago. He started smoking about 57 years ago. He smoked 3.50 packs per day. He has never used smokeless tobacco. He reports that he does not drink alcohol and does not use drugs. Family History:  No evidence for sudden cardiac death or premature CAD    Medications:  Reviewed and are listed in nursing record. and/or listed below  Outpatient Medications:  Prior to Admission medications    Medication Sig Start Date End Date Taking?  Authorizing Provider   docusate sodium (COLACE) 100 MG capsule Take 100 mg by mouth 2 times daily    Historical Provider, MD   valsartan (DIOVAN) 160 MG tablet Take 1 tablet by mouth daily 7/30/21   Flavia Holland MD   finasteride (PROSCAR) 5 MG tablet TAKE ONE TABLET BY MOUTH ONCE DAILY FOR PROSTATE (IF THIS MEDICATION IS CRUSHED PRIOR TO ADMINISTRATION, IT MUST NOT BE HANDLED BY WOMEN WHO ARE PREGNANT OR WHO MAY BECOME PREGNANT) 8/4/20   Historical Provider, MD   fluticasone (FLONASE) 50 MCG/ACT nasal spray USE 2 SPRAYS IN EACH NOSTRIL ONCE DAILY FOR NASAL ALLERGY  Patient not taking: Reported on 5/15/2022 4/22/21   Historical Provider, MD   diclofenac sodium (VOLTAREN) 1 % GEL APPLY 4 GM (USE DOSING CARD) TOPICALLY FOUR TIMES A DAY AS NEEDED FOR SPINE AND RIGHT HIP JOINT PAIN (UPS GROUND)  Patient not taking: Reported on 5/15/2022 4/22/21   Historical Provider, MD   famotidine (PEPCID) 20 MG tablet Take 20 mg by mouth daily Historical Provider, MD   Glucosamine-Chondroit-Vit C-Mn (GLUCOSAMINE CHONDR 500 COMPLEX PO) Take by mouth    Historical Provider, MD   atenolol (TENORMIN) 50 MG tablet Take 1 tablet by mouth daily 6/4/20   Deepti Irwin MD   ibuprofen (ADVIL;MOTRIN) 800 MG tablet Take 800 mg by mouth nightly    Historical Provider, MD   terazosin (HYTRIN) 2 MG capsule Take 5 mg by mouth nightly     Historical Provider, MD   vitamin D (CHOLECALCIFEROL) 1000 UNIT TABS tablet Take 1,000 Units by mouth 2 times daily    Historical Provider, MD   sildenafil (REVATIO) 20 MG tablet Take 20 mg by mouth as needed    Historical Provider, MD   atorvastatin (LIPITOR) 40 MG tablet Take 1 tablet by mouth daily. Patient taking differently: Take 40 mg by mouth nightly  7/30/14   Deepti Irwin MD   aspirin 81 MG tablet Take 81 mg by mouth daily. Historical Provider, MD   nitroGLYCERIN (NITROSTAT) 0.4 MG SL tablet Place 1 tablet under the tongue every 5 minutes as needed. Patient not taking: Reported on 5/15/2022 9/17/13   Deepti Iwrin MD   clopidogrel (PLAVIX) 75 MG tablet Take 1 tablet by mouth daily. 9/16/13   Deepti Irwin MD   Multiple Vitamins-Minerals [facility] COMPLETE PO) Take 1 tablet by mouth daily. Historical Provider, MD   diazepam (VALIUM) 10 MG tablet Take 10 mg by mouth nightly.      Historical Provider, MD       In-patient schedule medications:   atenolol  50 mg Oral Daily    atorvastatin  40 mg Oral Nightly    clopidogrel  75 mg Oral Daily    docusate sodium  100 mg Oral BID    famotidine  20 mg Oral Daily    finasteride  5 mg Oral Daily    fluticasone  2 spray Each Nostril Daily    Multi Complete  1 tablet Oral Daily    valsartan  160 mg Oral Daily    Vitamin D  1,000 Units Oral BID    doxazosin  2 mg Oral Daily    sodium chloride flush  10 mL IntraVENous 2 times per day    aspirin  81 mg Oral Daily    enoxaparin  40 mg SubCUTAneous QPM    nitroglycerin  0.5 inch Topical 3 times per day  furosemide  20 mg IntraVENous BID         Infusion Medications:   sodium chloride           Allergies: Other, Codeine, and Vicodin [hydrocodone-acetaminophen]     Review of Systems:   All 14 point review of symptoms completed. Pertinent positives identified in the HPI, all other review of symptoms findings as below.      Review of Systems - History obtained from the patient  General ROS: negative for - chills, fever or night sweats  Psychological ROS: negative for - disorientation or hallucinations  Ophthalmic ROS: negative for - dry eyes, eye pain or loss of vision  ENT ROS: negative for - nasal discharge or sore throat  Allergy and Immunology ROS: negative for - hives or itchy/watery eyes  Hematological and Lymphatic ROS: negative for - jaundice or night sweats  Endocrine ROS: negative for - mood swings or temperature intolerance  Breast ROS: deferred  Respiratory ROS: negative for - hemoptysis or stridor  Gastrointestinal ROS: no abdominal pain, change in bowel habits, or black or bloody stools  Genito-Urinary ROS: no dysuria, trouble voiding, or hematuria  Musculoskeletal ROS: negative for - gait disturbance, joint pain or joint stiffness  Neurological ROS: negative for - seizures or speech problems  Dermatological ROS: negative for - rash or skin lesion changes      Physical Examination:    [unfilled]  /70   Pulse 63   Temp 98.3 °F (36.8 °C) (Oral)   Resp 16   Ht 5' 6.5\" (1.689 m)   Wt 198 lb 1.6 oz (89.9 kg)   SpO2 96%   BMI 31.50 kg/m²    Weight: 198 lb 1.6 oz (89.9 kg)     Wt Readings from Last 3 Encounters:   05/16/22 198 lb 1.6 oz (89.9 kg)   07/30/21 188 lb 12.8 oz (85.6 kg)   06/16/21 190 lb (86.2 kg)       Intake/Output Summary (Last 24 hours) at 5/16/2022 0802  Last data filed at 5/16/2022 0407  Gross per 24 hour   Intake 120 ml   Output 2350 ml   Net -2230 ml       General Appearance:  Alert, cooperative, no distress, appears stated age   Head:  Normocephalic, without obvious abnormality, atraumatic   Eyes:  PERRL, conjunctiva/corneas clear       Nose: Nares normal, no drainage or sinus tenderness   Throat: Lips, mucosa, and tongue normal   Neck: Supple, symmetrical, trachea midline, no adenopathy, thyroid: not enlarged, symmetric, no tenderness/mass/nodules, no carotid bruit or JVD       Lungs:   Clear to auscultation bilaterally, respirations unlabored   Chest Wall:  No tenderness or deformity   Heart:  Regular rhythm and normal rate; S1, S2 are normal; no murmur noted; no rub or gallop   Abdomen:   Soft, non-tender, bowel sounds active all four quadrants,  no masses, no organomegaly           Extremities: Extremities normal, atraumatic, no cyanosis or edema   Pulses: 2+ and symmetric   Skin: Skin color, texture, turgor normal, no rashes or lesions   Pysch: Normal mood and affect   Neurologic: Normal gross motor and sensory exam.         Labs  Recent Labs     05/15/22  1649   WBC 4.0   HGB 13.1*   HCT 38.1*   MCV 96.9        Recent Labs     05/15/22  1649   CREATININE 1.3   BUN 26*      K 4.3      CO2 22     No results for input(s): INR, PROTIME in the last 72 hours. Recent Labs     05/15/22  1649 05/15/22  2121 05/16/22  0014   TROPONINI <0.01 <0.01 <0.01     Invalid input(s): PRO-BNP  No results for input(s): CHOL, LDL, HDL in the last 72 hours. Invalid input(s): TG      Imaging:  I have reviewed the below testing personally and my interpretation is below.   EKG:  Sinus rhythm with occasional Premature ventricular complexesSeptal infarct , age undeterminedST & T wave abnormality, consider inferior ischemiaAbnormal ECGWhen compared with ECG of 17-JAN-2015 06:43,Premature ventricular complexes are now PresentSeptal infarct is now PresentCriteria for Inferior infarct are no longer PresentT wave inversion now evident in Inferior leadsNonspecific T wave abnormality, worse in Lateral leads    CXR:      Assessment:  66 y.o. patient with:    Unstable angina ([facility] [address]

## 2022-05-16 NOTE — PROGRESS NOTES
4 Eyes Skin Assessment     The patient is being assess for  Admission    I agree that 2 RN's have performed a thorough Head to Toe Skin Assessment on the patient. ALL assessment sites listed below have been assessed. Areas assessed by both nurses: [place]ogo  [x]   Head, Face, and Ears   [x]   Shoulders, Back, and Chest  [x]   Arms, Elbows, and Hands   [x]   Coccyx, Sacrum, and Ischum  [x]   Legs, Feet, and Heels        Does the Patient have Skin Breakdown?   No         Piotr Prevention initiated:  No   Wound Care Orders initiated:  No      [facility] nurse consulted for Pressure Injury (Stage 3,4, Unstageable, DTI, NWPT, and Complex wounds):  No      Nurse 1 eSignature: Electronically signed by Jes Grenee RN on 5/15/22 at 11:38 PM EDT    **SHARE this note so that the co-signing nurse is able to place an eSignature**    Nurse 2 eSignature: Electronically signed by Bruce Daniels RN on 5/15/22 at 11:39 PM EDT

## 2022-05-16 NOTE — POST SEDATION
Patient:  [place]   :   1950    A pre-sedation re-evaluation was performed immediately at the end of the procedure. Procedure:  Cardiac cath  Medications: Procedural sedation with minimal conscious sedation  Complications: None  Estimated Blood Loss: none  Specimens: Were not obtained        [place] Medication and Procedural Reconciliation:  I agree that the documented medications and procedures performed are true. The medications were given under my order. The procedures were performed under my direct supervision.

## 2022-05-17 VITALS
HEIGHT: 67 IN | OXYGEN SATURATION: 94 % | DIASTOLIC BLOOD PRESSURE: 71 MMHG | SYSTOLIC BLOOD PRESSURE: 138 MMHG | HEART RATE: 58 BPM | WEIGHT: 189.9 LBS | RESPIRATION RATE: 18 BRPM | TEMPERATURE: 97.2 F | BODY MASS INDEX: 29.81 KG/M2

## 2022-05-17 LAB
A/G RATIO: 2 (ref 1.1–2.2)
ALBUMIN SERPL-MCNC: 4.2 G/DL (ref 3.4–5)
ALP BLD-CCNC: 44 U/L (ref 40–129)
ALT SERPL-CCNC: 27 U/L (ref 10–40)
ANION GAP SERPL CALCULATED.3IONS-SCNC: 12 MMOL/L (ref 3–16)
AST SERPL-CCNC: 22 U/L (ref 15–37)
BILIRUB SERPL-MCNC: 0.4 MG/DL (ref 0–1)
BUN BLDV-MCNC: 29 MG/DL (ref 7–20)
CALCIUM SERPL-MCNC: 9.7 MG/DL (ref 8.3–10.6)
CHLORIDE BLD-SCNC: 104 MMOL/L (ref 99–110)
CO2: 22 MMOL/L (ref 21–32)
CREAT SERPL-MCNC: 1.3 MG/DL (ref 0.8–1.3)
GFR AFRICAN AMERICAN: >60
GFR NON-AFRICAN AMERICAN: 54
GLUCOSE BLD-MCNC: 113 MG/DL (ref 70–99)
HCT VFR BLD CALC: 41.3 % (ref 40.5–52.5)
HEMOGLOBIN: 14.1 G/DL (ref 13.5–17.5)
LV EF: 58 %
LVEF MODALITY: NORMAL
MAGNESIUM: 2 MG/DL (ref 1.8–2.4)
MCH RBC QN AUTO: 33.1 PG (ref 26–34)
MCHC RBC AUTO-ENTMCNC: 34.3 G/DL (ref 31–36)
MCV RBC AUTO: 96.7 FL (ref 80–100)
PDW BLD-RTO: 13.2 % (ref 12.4–15.4)
PLATELET # BLD: 148 K/UL (ref 135–450)
PMV BLD AUTO: 7.3 FL (ref 5–10.5)
POTASSIUM SERPL-SCNC: 4 MMOL/L (ref 3.5–5.1)
RBC # BLD: 4.27 M/UL (ref 4.2–5.9)
SODIUM BLD-SCNC: 138 MMOL/L (ref 136–145)
TOTAL PROTEIN: 6.3 G/DL (ref 6.4–8.2)
WBC # BLD: 5.5 K/UL (ref 4–11)

## 2022-05-17 PROCEDURE — 99233 SBSQ HOSP IP/OBS HIGH 50: CPT | Performed by: NURSE PRACTITIONER

## 2022-05-17 PROCEDURE — C8929 TTE W OR WO FOL WCON,DOPPLER: HCPCS

## 2022-05-17 PROCEDURE — 85027 COMPLETE CBC AUTOMATED: CPT

## 2022-05-17 PROCEDURE — 6370000000 HC RX 637 (ALT 250 FOR IP): Performed by: INTERNAL MEDICINE

## 2022-05-17 PROCEDURE — 80053 COMPREHEN METABOLIC PANEL: CPT

## 2022-05-17 PROCEDURE — 2580000003 HC RX 258: Performed by: INTERNAL MEDICINE

## 2022-05-17 PROCEDURE — 83735 ASSAY OF MAGNESIUM: CPT

## 2022-05-17 PROCEDURE — 36415 COLL VENOUS BLD VENIPUNCTURE: CPT

## 2022-05-17 RX ORDER — ATENOLOL 50 MG/1
25 TABLET ORAL DAILY
Qty: 90 TABLET | Refills: 3
Start: 2022-05-17 | End: 2022-06-08 | Stop reason: DRUGHIGH

## 2022-05-17 RX ORDER — FUROSEMIDE 20 MG/1
20 TABLET ORAL DAILY PRN
Qty: 30 TABLET | Refills: 1 | Status: SHIPPED | OUTPATIENT
Start: 2022-05-17

## 2022-05-17 RX ORDER — ISOSORBIDE MONONITRATE 30 MG/1
30 TABLET, EXTENDED RELEASE ORAL DAILY
Qty: 30 TABLET | Refills: 3 | Status: SHIPPED | OUTPATIENT
Start: 2022-05-17 | End: 2022-06-08 | Stop reason: SINTOL

## 2022-05-17 RX ADMIN — SODIUM CHLORIDE, PRESERVATIVE FREE 10 ML: 5 INJECTION INTRAVENOUS at 08:28

## 2022-05-17 RX ADMIN — DOCUSATE SODIUM 100 MG: 100 CAPSULE, LIQUID FILLED ORAL at 08:25

## 2022-05-17 RX ADMIN — Medication 1 TABLET: at 08:25

## 2022-05-17 RX ADMIN — FLUTICASONE PROPIONATE 2 SPRAY: 50 SPRAY, METERED NASAL at 08:26

## 2022-05-17 RX ADMIN — FAMOTIDINE 20 MG: 20 TABLET, FILM COATED ORAL at 08:25

## 2022-05-17 RX ADMIN — FINASTERIDE 5 MG: 5 TABLET, FILM COATED ORAL at 08:25

## 2022-05-17 RX ADMIN — ASPIRIN 81 MG: 81 TABLET, COATED ORAL at 08:25

## 2022-05-17 RX ADMIN — CLOPIDOGREL BISULFATE 75 MG: 75 TABLET ORAL at 08:25

## 2022-05-17 RX ADMIN — ATENOLOL 50 MG: 25 TABLET ORAL at 08:25

## 2022-05-17 RX ADMIN — VALSARTAN 160 MG: 80 TABLET, FILM COATED ORAL at 08:25

## 2022-05-17 RX ADMIN — Medication 1000 UNITS: at 08:25

## 2022-05-17 RX ADMIN — SODIUM CHLORIDE, PRESERVATIVE FREE 10 ML: 5 INJECTION INTRAVENOUS at 08:27

## 2022-05-17 RX ADMIN — DOXAZOSIN 2 MG: 2 TABLET ORAL at 08:25

## 2022-05-17 NOTE — PLAN OF CARE
Problem: Safety - Adult  Goal: Free from fall injury  Outcome: Progressing   Pt will remain free from falls throughout hospital stay. Fall precautions in place, bed alarm not on due to pt independent, bed in lowest position with wheels locked and side rails 2/4 up. Room door open and hourly rounding completed. Will continue to monitor throughout shift. Call light and bedside table within pt's reach.

## 2022-05-17 NOTE — DISCHARGE SUMMARY
Hospital Medicine Discharge Summary    Patient ID: Tana Stratton      Patient's PCP: Valeria Izaguirre MD    Admit Date: 5/15/2022     Discharge Date:   05/17/22     Admitting Provider: Eliel Veliz MD     Discharge Provider: Anand Fernández MD     Discharge Diagnoses: Active Hospital Problems    Diagnosis     S/P CABG x 1 [Z95.1]      Priority: High    Coronary atherosclerosis of native coronary artery [I25.10]      Priority: High    Chest pain, moderate coronary artery risk [R07.9]      Priority: Medium    Pulmonary vascular congestion [R09.89]      Priority: Medium    HTN (hypertension) [I10]     Unstable angina ([address] [I20.0]        The patient was seen and examined on day of discharge and this discharge summary is in conjunction with any daily progress note from day of discharge. Hospital Course:     Patient presented with acute recurrent and severe substernal chest pain and chest discomfort. Evaluated by cardiology. Cardiac cath was performed. PCI was not indicated. Medical management was recommended by cardiology. Admitted with mild pulmonary edema on arrival. Resolved with two doses of IV lasix. Remained stable on RA without hypoxia. Will be discharged home with no new prescriptions. Follow up with cardiology            Physical Exam Performed:     /71   Pulse 58   Temp 97.2 °F (36.2 °C) (Oral)   Resp 18   Ht 5' 6.5\" (1.689 m)   Wt 189 lb 14.4 oz (86.1 kg)   SpO2 94%   BMI 30.19 kg/m²       General appearance:  No apparent distress, appears stated age and cooperative. HEENT:  Normal cephalic, atraumatic without obvious deformity. Pupils equal, round, and reactive to light. Extra ocular muscles intact. Conjunctivae/corneas clear. Neck: Supple, with full range of motion. No jugular venous distention. Trachea midline. Respiratory:  Normal respiratory effort. Clear to auscultation, bilaterally without Rales/Wheezes/Rhonchi.   Cardiovascular:  Regular rate and rhythm with normal S1/S2 without murmurs, rubs or gallops. Abdomen: Soft, non-tender, non-distended with normal bowel sounds. Musculoskeletal:  No clubbing, cyanosis or edema bilaterally. Full range of motion without deformity. Skin: Skin color, texture, turgor normal.  No rashes or lesions. Neurologic:  Neurovascularly intact without any focal sensory/motor deficits. Cranial nerves: II-XII intact, grossly non-focal.  Psychiatric:  Alert and oriented, thought content appropriate, normal insight  Capillary Refill: Brisk,< 3 seconds   Peripheral Pulses: +2 palpable, equal bilaterally       Labs: For convenience and continuity at follow-up the following most recent labs are provided:      CBC:    Lab Results   Component Value Date    WBC 5.5 05/17/2022    HGB 14.1 05/17/2022    HCT 41.3 05/17/2022     05/17/2022       Renal:    Lab Results   Component Value Date     05/17/2022    K 4.0 05/17/2022    K 4.0 05/16/2022     05/17/2022    CO2 22 05/17/2022    BUN 29 05/17/2022    CREATININE 1.3 05/17/2022    CALCIUM 9.7 05/17/2022    PHOS 2.4 03/19/2012         Significant Diagnostic Studies    Radiology:   XR CHEST PORTABLE   Final Result   Mild prominence of the perihilar lung markings could represent mild central   vascular congestion however no overt edema or pleural effusion.                 Consults:     IP CONSULT TO HOSPITALIST  IP CONSULT TO CARDIOLOGY    Disposition:  Home     Condition at Discharge: Stable    Discharge Instructions/Follow-up:  PCP, cardiology    Code Status:  Full Code     Activity: activity as tolerated    Diet: regular diet      Discharge Medications:     Current Discharge Medication List           Details   docusate sodium (COLACE) 100 MG capsule Take 100 mg by mouth 2 times daily      valsartan (DIOVAN) 160 MG tablet Take 1 tablet by mouth daily  Qty: 30 tablet, Refills: 11      finasteride (PROSCAR) 5 MG tablet TAKE ONE TABLET BY MOUTH ONCE DAILY FOR PROSTATE (IF THIS MEDICATION IS CRUSHED PRIOR TO ADMINISTRATION, IT MUST NOT BE HANDLED BY WOMEN WHO ARE PREGNANT OR WHO MAY BECOME PREGNANT)      fluticasone (FLONASE) 50 MCG/ACT nasal spray USE 2 SPRAYS IN EACH NOSTRIL ONCE DAILY FOR NASAL ALLERGY      diclofenac sodium (VOLTAREN) 1 % GEL APPLY 4 GM (USE DOSING CARD) TOPICALLY FOUR TIMES A DAY AS NEEDED FOR SPINE AND RIGHT HIP JOINT PAIN (UPS GROUND)      famotidine (PEPCID) 20 MG tablet Take 20 mg by mouth daily      Glucosamine-Chondroit-Vit C-Mn (GLUCOSAMINE CHONDR 500 COMPLEX PO) Take by mouth      atenolol (TENORMIN) 50 MG tablet Take 1 tablet by mouth daily  Qty: 90 tablet, Refills: 3      ibuprofen (ADVIL;MOTRIN) 800 MG tablet Take 800 mg by mouth nightly      terazosin (HYTRIN) 2 MG capsule Take 5 mg by mouth nightly       vitamin D (CHOLECALCIFEROL) 1000 UNIT TABS tablet Take 1,000 Units by mouth 2 times daily      sildenafil (REVATIO) 20 MG tablet Take 20 mg by mouth as needed      atorvastatin (LIPITOR) 40 MG tablet Take 1 tablet by mouth daily. Qty: 30 tablet, Refills: 0      aspirin 81 MG tablet Take 81 mg by mouth daily. nitroGLYCERIN (NITROSTAT) 0.4 MG SL tablet Place 1 tablet under the tongue every 5 minutes as needed. Qty: 25 tablet, Refills: 1      clopidogrel (PLAVIX) 75 MG tablet Take 1 tablet by mouth daily. Qty: 90 tablet, Refills: 3      Multiple Vitamins-Minerals (MULTI COMPLETE PO) Take 1 tablet by mouth daily. diazepam (VALIUM) 10 MG tablet Take 10 mg by mouth nightly. Time Spent on discharge is more than 45 minutes in the examination, evaluation, counseling and review of medications and discharge plan. Signed:    Eliezer Larios MD   5/17/2022      Thank you Rashida Ugalde MD for the opportunity to be involved in this patient's care. If you have any questions or concerns, please feel free to contact me at [phone].

## 2022-05-17 NOTE — CARE COORDINATION
CASE MANAGEMENT DISCHARGE SUMMARY      Discharge to: home w/ no needs  IMM given: (date) 05/15/2022  Transportation: private  Confirmed discharge plan with:     Patient: yes, pt will contact family   RN, name: Raysa Tavarez RN    Note: Pt a/o, able to ambulate, has PCP and insurance. Pt has no DCP needs at this time. Should needs arise, please contact DCP.  Silvana Roe RN

## 2022-05-17 NOTE — PROGRESS NOTES
[address]   Daily Progress Note    Admit Date:  5/15/2022  HPI:    Chief Complaint   Patient presents with    Chest Pain     strated 1430 today    Shortness of Breath      Mikael Gaffney presented with chest pain, shortness of breath, fatigue that has been worsening over the past several weeks. He noticed decreased activity tolerance. He has a history of CAD with PCI to the RCA (multiple), s/p CABG with SVG to the PDA, PCI to the LAD, ischemic cardiomyopathy, hypertension, hyperlipidemia. Subjective:  Mr. Fredy Lam seen up in room, ready for discharge. He states he is already feeling better. Thinks the Lasix helped. He admits to fatigue and decreased activity tolerance recently.      Objective:   Patient Vitals for the past 24 hrs:   BP Temp Temp src Pulse Resp SpO2 Weight   05/17/22 0815 138/71 97.2 °F (36.2 °C) Oral 58 18 94 % --   05/17/22 0348 123/79 97.6 °F (36.4 °C) Axillary 52 18 95 % 189 lb 14.4 oz (86.1 kg)   05/17/22 0006 124/70 97.5 °F (36.4 °C) Axillary 57 18 96 % --   05/16/22 1941 (!) 140/76 97.7 °F (36.5 °C) Oral 56 16 95 % --   05/16/22 1804 124/68 97.6 °F (36.4 °C) Oral 55 16 95 % --   05/16/22 1645 132/69 -- -- 52 16 94 % --   05/16/22 1545 (!) 117/58 -- -- 58 18 96 % --   05/16/22 1452 134/65 -- -- 53 18 97 % --   05/16/22 1430 123/68 -- -- 52 16 98 % --   05/16/22 1415 110/62 -- -- 51 16 98 % --   05/16/22 1400 111/75 -- -- (!) 49 16 95 % --   05/16/22 1348 110/62 -- -- 52 16 95 % --   05/16/22 1325 112/61 -- -- (!) 49 16 95 % --   05/16/22 1306 97/63 -- -- (!) 49 18 92 % --   05/16/22 1250 (!) 110/59 97.8 °F (36.6 °C) Oral 50 18 95 % --   05/16/22 1159 (!) 140/74 97.9 °F (36.6 °C) Oral 55 18 95 % --       Intake/Output Summary (Last 24 hours) at 5/17/2022 1141  Last data filed at 5/17/2022 1033  Gross per 24 hour   Intake 480 ml   Output --   Net 480 ml     Wt Readings from Last 3 Encounters:   05/17/22 189 lb 14.4 oz (86.1 kg)   07/30/21 188 lb 12.8 oz (85.6 kg)   06/16/21 190 lb (86.2 kg)         ASSESSMENT:   1. Chest pain: Troponins and EKG negative for ACS  2. CAD: History of multiple PCI's to the RCA as well as CABG x1 to the RCA/ PDA, PCI to the LAD, s/p LHC with no intervention warranted; on aspirin, Plavix, atenolol, and atorvastatin  3. CHF, acute combined: Vascular congestion noted on chest x-ray, BNP elevated at 723, s/p Lasix IV 20 mg X2  4. Cardiomyopathy, ischemic: EF 49% by cardiac MR, 50-55% by LVG, on atenolol and valsartan  5. HYPERTENSION: stable  6. HLD: LDL 68, at goal, on statin  7. Renal insufficiency: stable post cath      PLAN:  1. Decrease atenolol from 50 mg to 25 mg daily given bradycardia, renal insufficiency, fatigue/ decreased activity tolerance  2. Lasix 20 mg to use prn dyspnea or weight gain > 3 lbs in 1 day (he weighs self daily)  3. Add long acting nitrate as anti-anginal = Imdur 30 mg once daily  4. Okay for discharge from cardiac perspective. Will review cardiac medications on discharge medication reconciliation form. Patient has follow up appointment with Dr. Stefan Castellon in 3 weeks.       JEFF Peraza CNP, 5/17/2022, 11:41 AM  [facility]   [phone]       Telemetry: SR 50's  NYHA: III    Physical Exam:  General:  Awake, alert, NAD  Skin:  Warm and dry  Neck:  JVP 8 cm upright  Chest:  Clear to auscultation except fine right basilar rales  Cardiovascular:  RRR, normal S1S2, no m/g/r  Abdomen:  Soft, nontender, +bowel sounds  Extremities:  No BLE edema  right radial site without ooze, bruise or hematoma, dressing C,D,I, 2+ pulse      Medications:    sodium chloride flush  5-40 mL IntraVENous 2 times per day    atenolol  50 mg Oral Daily    atorvastatin  40 mg Oral Nightly    clopidogrel  75 mg Oral Daily    docusate sodium  100 mg Oral BID    famotidine  20 mg Oral Daily    finasteride  5 mg Oral Daily    fluticasone  2 spray Each Nostril Daily    therapeutic multivitamin-minerals  1 tablet Oral Daily    valsartan 160 mg Oral Daily    Vitamin D  1,000 Units Oral BID    doxazosin  2 mg Oral Daily    aspirin  81 mg Oral Daily    enoxaparin  40 mg SubCUTAneous QPM      sodium chloride      sodium chloride         Lab Data: Lab results independently reviewed and analyzed by myself 5/17/2022    CBC:   Recent Labs     05/15/22  1649 05/16/22  0719 05/17/22  0723   WBC 4.0 5.7 5.5   HGB 13.1* 13.7 14.1    146 148     BMP:    Recent Labs     05/15/22  1649 05/16/22  0719 05/17/22  0723    140 138   K 4.3 4.0 4.0   CO2 22 22 22   BUN 26* 25* 29*   CREATININE 1.3 1.3 1.3     INR:    Recent Labs     05/16/22  0720   INR 1.14*     BNP:    Recent Labs     05/15/22  1649   PROBNP 723*     Cardiac Enzymes:   Recent Labs     05/15/22  1649 05/15/22  2121 05/16/22  0014   TROPONINI <0.01 <0.01 <0.01     Lipids:   Lab Results   Component Value Date    TRIG 124 05/16/2022    TRIG 181 01/16/2015    HDL 53 05/16/2022    HDL 52 01/16/2015    LDLCALC 68 05/16/2022    LDLCALC 59 01/16/2015       Cardiac Imaging:   Cardiac catheterization 5/16/2022   Procedures Performed:   1. Left heart catheterization  2. Selective left and right coronary angiogram  3. Left ventriculography   4. SVG angiogram to the RCA   Procedure Findings:  1. Moderate to severe multi vessel coronary artery diease  2. Normal left ventricular function with EF estimated at 55-60%  3. Normal left heart hemodynamics  4. Patent SVG to the RCA  Findings:   1. Left main coronary artery was normal. It gave off the left anterior descending artery and left circumflex. 2. Left anterior descending artery has moderate and very calcific atherosclerotic disease. It was moderate in size. It gave off septal perforators and a moderate sized diagonal branch. The LAD covered the entire apex of the left ventricle. 3. Left circumflex has moderate and very calcific atherosclerotic disease. It was moderate in size. There was a moderate sized obtuse marginal branch.    4. Right coronary artery has severe atherosclerotic disease. It was moderate in size and was the dominant artery. Vessels occluded in the proximal segment. There are previously placed stents throughout the entirety of the right coronary artery. There is no antegrade flow. ~There is a saphenous vein graft is not gauged with the multipurpose catheter. The saphenous vein graft anastomosis into the PDA of the right coronary. There is adequate antegrade and retrograde filling of the right coronary artery. There is noted to be severe diffuse disease of the native right coronary artery. 5. Left ventriculogram showed normal LVEF at 55-60%. Wall motion was normal . There was no significant mitral valve or aortic valve disease noted. LVEDP was normal. There was no gradient noted across the aortic valve during pullback of the catheter. CONCLUSIONS:  1. Moderate to severe multi-vessel coronary artery disease with a patent saphenous vein graft to the RCA. ASSESSMENT/RECOMMENDATIONS:   1. Medical management with emphasis on antianginal therapy. Cardiac MRI 9/16/2021  CONCLUSIONS Findings are consistent with an ischemic cardiomyopathy with mildly reduced LV function. No RA mass noted. -Normal left ventricular size and systolic function with a calculated ejection fraction of 49% by Lee's method.   -Normal right ventricular size and systolic function with a calculated ejection fraction of 59% by Lee's method.   -No myocardial edema by T2w imaging. (Normal myocardium/skeletal ratio - normal < 1.9). -Right atrium: there is a prominent Eustachian valve and prominent Chiari network.   -No intracardiac shunt. Calculated Qp:Qs:0.98 (Phase contrast velocity encoding Ao/Pa)   -Aneurysmal interatrial septum.   -Normal myocardial resting perfusion imaging.   -On delayed enhancement imaging, there is transmural enhancement involving mid inferior/inferolateral and distal inferior segments.  Findings consistent with prior infarction. The MRI sequences and imaging planes in this study were tailored for cardiac imaging and are suboptimal for evaluation of non-cardiac structures. SWETA 8/3/2021   Left ventricular systolic function is normal with an estimated ejection fraction of 55%. Prominent Eustachian valve is noted. Moderate sized sessile echo density on right atrial free wall of unknown etiology. Small amount of layered plaque in the aorta. There is no evidence of mass or thrombus in the left atrium or appendage. Redundancy of the interatrial septum. A bubble study was performed and showed no evidence of shunting. Mild mitral regurgitation. Mild aortic regurgitation. Recommend cardiac MRI. Echocardiogram [facility] 7/16/2021  Eustachian Kristene Lubna is noted in the RA and mass growing in the [facility] can not be rules out   Moderate mitral reg, EF 45%     Echocardiogram 2/17/2020  Summary   The left ventricular systolic function is mildly reduced with an ejection fraction of 45 %. There is akinesis of the basal/mid inferior and basal/mid inferolateral walls. Normal left ventricular diastolic filling pressure. The left atrium is mildly dilated. The aortic root is mildly dilated at 3.8cm. Mild mitral regurgitation. Mild aortic regurgitation. Systolic pulmonary artery pressure (SPAP) is normal estimated at 12 mmHg (Right atrial pressure of 3 mmHg). Cath 1/2015  LM <20%  LAD 70% mid, FFR 0.66  Cx mid stent patent  %   SVG to RPDA patent  LV: inferior hypokinesis, EF 45%  PTCA LAD  2.25 x 23 AVA     Myoview 9/5/13   Moderate sized posterobasal and inferolateral fixed defect consistent with infarction in the territory of the mid and distal LCx and/or RCA . LV function is mildly reduced with inferior hypokinesis and ejection fraction of 48 %. Echocardiogram 9/10/13  Summary  Normal left ventricle size and wall thickness. The left ventricular systolic  function appears reduced with an ejection fraction of 45-50%. There is mild  hypokinesis and myocardial thinning of the posterior wall  Mild mitral, aortic and tricuspid regurgitation. RVSP estimated at 29 mmHg. The left atrium is mildly dilated.

## 2022-05-18 ENCOUNTER — CARE COORDINATION (OUTPATIENT)
Dept: CASE MANAGEMENT | Age: 72
End: 2022-05-18

## 2022-05-18 ENCOUNTER — TELEPHONE (OUTPATIENT)
Dept: CARDIOLOGY CLINIC | Age: 72
End: 2022-05-18

## 2022-05-18 LAB
EKG ATRIAL RATE: 55 BPM
EKG DIAGNOSIS: NORMAL
EKG P AXIS: 36 DEGREES
EKG P-R INTERVAL: 198 MS
EKG Q-T INTERVAL: 436 MS
EKG QRS DURATION: 108 MS
EKG QTC CALCULATION (BAZETT): 417 MS
EKG R AXIS: 5 DEGREES
EKG T AXIS: -48 DEGREES
EKG VENTRICULAR RATE: 55 BPM
ESTIMATED AVERAGE GLUCOSE: 116.9 MG/DL
HBA1C MFR BLD: 5.7 %

## 2022-05-18 PROCEDURE — 93010 ELECTROCARDIOGRAM REPORT: CPT | Performed by: INTERNAL MEDICINE

## 2022-05-18 NOTE — TELEPHONE ENCOUNTER
Created telephone encounter. Spoke with Terrell Kendrick relayed message per VSP regarding echo. Pt verbalized understanding. Pt would like to know if he should continue the medication changes that were made at the hospital 5/15. He now takes Imdur 30 daily and his atenolol is now 1/2 tablet of 50 mg daily. Please advise.

## 2022-05-18 NOTE — TELEPHONE ENCOUNTER
----- Message from Jasper Lopez MD sent at 5/18/2022 10:55 AM EDT -----  The test does NOT show any major change from prior testing. Please inform the patient of the results.

## 2022-05-18 NOTE — CARE COORDINATION
[address] Transitions Initial Follow Up Call    Call within 2 business days of discharge: Yes    Patient: Aide Rivera Patient : [id number]   MRN: [id number]  Reason for Admission: angina  Discharge Date: 22 RARS: Readmission Risk Score: 8.3 ( )      Last Discharge [facility]       Complaint Diagnosis Description Type Department Provider    5/15/22 Chest Pain; Shortness of Breath Chest pain, unspecified type ED to Hosp-Admission (Discharged) (ADMITTED) London Freed MD; Aurora Grey. .. Spoke with: Ned [address]: LUIS ARMANDO    Non-face-to-face services provided:  Obtained and reviewed discharge summary and/or continuity of care documents     Transitions of Care Initial Call    Challenges to be reviewed by the provider   Additional needs identified to be addressed with provider: No  none             Method of communication with provider : none    Advance Care Planning:   Does patient have an Advance Directive: reviewed and current, reviewed and needs to be updated, not on file; education provided, not on file, patient declined education, decision maker updated and referral to internal ACP facilitator. Care Transition Nurse contacted the patient by telephone to perform post hospital discharge assessment. Verified name and  with patient as identifiers. Provided introduction to self, and explanation of the CTN role. CTN reviewed discharge instructions, medical action plan and red flags with patient who verbalized understanding. Patient given an opportunity to ask questions and does not have any further questions or concerns at this time. Were discharge instructions available to patient? Yes. Reviewed appropriate site of care based on symptoms and resources available to patient including: Specialist. The patient agrees to contact the PCP office for questions related to their healthcare.      Medication reconciliation was performed with patient, who verbalizes understanding of administration of home medications. Advised obtaining a 90-day supply of all daily and as-needed medications. Was patient discharged with a pulse oximeter? no    Patient answered call and verified . Patient pleasant and agreeable to transition call. Patient feeling well since d/c home. Patient taking all medications as directed. Declined full medication reconciliation. Follow up visit scheduled and patient confirmed transportation. Denied any acute needs at present time. Educated on the use of urgent care or physicians 24 hr access line if assistance is needed after hours. CTN provided contact information. No further follow-up call indicated based on severity of symptoms and risk factors.         Care Transitions 24 Hour Call    Do you have a copy of your discharge instructions?: Yes  Do you have all of your prescriptions and are they filled?: Yes  Have you been contacted by a [address]?: No  Have you scheduled your follow up appointment?: Yes  How are you going to get to your appointment?: Car - family or friend to transport  Do you have support at home?: Partner/Spouse/SO  Do you feel like you have everything you need to keep you well at home?: Yes  Are you an active caregiver in your home?: No  Care Transitions Interventions         Follow Up  Future Appointments   Date Time Provider [address]   2022  2:45 PM Enrique Gonzalez  [facility], RN

## 2022-06-08 ENCOUNTER — OFFICE VISIT (OUTPATIENT)
Dept: CARDIOLOGY CLINIC | Age: 72
End: 2022-06-08
Payer: MEDICARE

## 2022-06-08 VITALS
WEIGHT: 198 LBS | BODY MASS INDEX: 31.08 KG/M2 | DIASTOLIC BLOOD PRESSURE: 68 MMHG | SYSTOLIC BLOOD PRESSURE: 134 MMHG | HEART RATE: 60 BPM | HEIGHT: 67 IN | OXYGEN SATURATION: 97 %

## 2022-06-08 DIAGNOSIS — E78.00 PURE HYPERCHOLESTEROLEMIA: ICD-10-CM

## 2022-06-08 DIAGNOSIS — I10 PRIMARY HYPERTENSION: ICD-10-CM

## 2022-06-08 DIAGNOSIS — I51.89 RIGHT ATRIAL MASS: ICD-10-CM

## 2022-06-08 DIAGNOSIS — I25.110 ATHEROSCLEROSIS OF NATIVE CORONARY ARTERY OF NATIVE HEART WITH UNSTABLE ANGINA PECTORIS (HCC): Primary | ICD-10-CM

## 2022-06-08 DIAGNOSIS — Z95.1 S/P CABG X 1: ICD-10-CM

## 2022-06-08 PROCEDURE — 99214 OFFICE O/P EST MOD 30 MIN: CPT | Performed by: INTERNAL MEDICINE

## 2022-06-08 PROCEDURE — G8427 DOCREV CUR MEDS BY ELIG CLIN: HCPCS | Performed by: INTERNAL MEDICINE

## 2022-06-08 PROCEDURE — 3017F COLORECTAL CA SCREEN DOC REV: CPT | Performed by: INTERNAL MEDICINE

## 2022-06-08 PROCEDURE — G8417 CALC BMI ABV UP PARAM F/U: HCPCS | Performed by: INTERNAL MEDICINE

## 2022-06-08 PROCEDURE — 1123F ACP DISCUSS/DSCN MKR DOCD: CPT | Performed by: INTERNAL MEDICINE

## 2022-06-08 PROCEDURE — 1036F TOBACCO NON-USER: CPT | Performed by: INTERNAL MEDICINE

## 2022-06-08 PROCEDURE — 1111F DSCHRG MED/CURRENT MED MERGE: CPT | Performed by: INTERNAL MEDICINE

## 2022-06-08 RX ORDER — NITROGLYCERIN 0.4 MG/1
0.4 TABLET SUBLINGUAL EVERY 5 MIN PRN
Qty: 25 TABLET | Refills: 3 | Status: SHIPPED | OUTPATIENT
Start: 2022-06-08

## 2022-06-08 RX ORDER — ATENOLOL 25 MG/1
25 TABLET ORAL DAILY
Qty: 90 TABLET | Refills: 3 | Status: SHIPPED
Start: 2022-06-08

## 2022-06-08 RX ORDER — AMLODIPINE BESYLATE 5 MG/1
5 TABLET ORAL NIGHTLY
Qty: 30 TABLET | Refills: 5 | Status: SHIPPED | OUTPATIENT
Start: 2022-06-08

## 2022-06-08 NOTE — PATIENT INSTRUCTIONS
Plan:  Discontinue Imdur  Start amlodipine 5 mg once nightly  Continue atenolol 25 mg once daily   Continue to monitor blood pressure at home   Call office in one month to discuss symptoms and blood pressur    Follow up in 6 months

## 2022-06-08 NOTE — PROGRESS NOTES
[address]   Cardiac Consultation    Referring Provider:  Jared Guajardo MD     Chief Complaint   Patient presents with    1 Year Follow Up    Hyperlipidemia    Hypertension    Medication Reaction     Imdur - caused nausea     Medication Refill     Nitro       Molly Gregory   1950     History of Present Illness:   Molly Gregory is a 74 y.o. male who is here today for follow up for a history of coronary artery disease- Multiple PCI of RCA in past including beta radiation. Recurrent restenosis RCA required 1V-CABG - SVG to PDA. PCI/AVA LAD 1/2015, hypertension, hyperlipidemia, cardiomyopathy. Echocardiogram from 2/17/2020 showed an EF of 45% with mild mitral and aortic regurgitation. An echocardiogram from the [facility] 7/16/2021 showed Eustachian [place] is noted in the RA and mass growing in the [facility] can not be rules out, moderate mitral reg, EF 45%. Since last office visit he had a transesophageal echocardiogram performed on 08/03/2021 showing mild reduced to low normal LVEF, Mild MR  Mild AI, Prominent Eustachian valve, and Moderate size sessile mass attached RA free wall. A cardiac MRI was ordered and completed on 09/16/2021 Findings are consistent with an ischemic cardiomyopathy with mildly reduced LV function. No RA mass noted. Patient was hospitalized from 05/15/2022-05/17/2022 after he presented to ED with complaints of chest pain and shortness of breath. Troponin <0.01 x 3. BNP elevated 723. EKG Sinus rhythm with occasional Premature ventricular complexes Septal infarct, age undeterminedST & T wave abnormality, consider inferior ischemia. He underwent C 05/15/2022 for unstable angina showing moderate to severe multivessel CAD, patent saphenous vein graft to RCA. Echocardiogram EF 55-60%, mild AR. Today he states he has dyspnea on exertion such as climbing a flight of stairs. He states he recently started taking Imdur however it made him feel extremely fatigued and worse.  He stopped taking it. Patient currently denies any weight gain, edema, palpitations, dizziness, and syncope. He checks BP at home regularly. Occ chest pain seems to have increased. He states BP running at home normal. Denies recent issues with bleeding or bruising. Atenolol reduced in hospital 5/2022 due to low HR. Past Medical History:   has a past medical history of BPH (benign prostatic hyperplasia), CAD (coronary artery disease), Chronic back pain, Gastric ulcer, GERD (gastroesophageal reflux disease), Hyperlipidemia, Hypertension, MI (myocardial infarction) ([address] Osteoarthritis, and Sciatic pain. Surgical History:   has a past surgical history that includes Shoulder arthroscopy (05/2010); Coronary artery bypass graft (2012); Spine surgery; Coronary angioplasty with stent; Intracapsular cataract extraction (Right, 10/11/2019); Intracapsular cataract extraction (Left, 10/25/2019); and Tonsillectomy. Social History:   reports that he quit smoking about 39 years ago. He started smoking about 58 years ago. He smoked 3.50 packs per day. He has never used smokeless tobacco. He reports that he does not drink alcohol and does not use drugs. Family History:  family history includes Heart Disease in his brother, brother, brother, father, mother, and sister; High Blood Pressure in his father and mother. Home Medications:  Prior to Admission medications    Medication Sig Start Date End Date Taking?  Authorizing Provider   atenolol (TENORMIN) 50 MG tablet Take 0.5 tablets by mouth daily 5/17/22  Yes JEFF Chou - CNP   furosemide (LASIX) 20 MG tablet Take 1 tablet by mouth daily as needed (dyspnea, weight gain) 5/17/22  Yes JEFF Padilla - CNP   docusate sodium (COLACE) 100 MG capsule Take 100 mg by mouth 2 times daily   Yes Historical Provider, MD   valsartan (DIOVAN) 160 MG tablet Take 1 tablet by mouth daily 7/30/21  Yes Harvie Goodpasture, MD   finasteride (PROSCAR) 5 MG tablet TAKE ONE TABLET BY MOUTH ONCE DAILY FOR PROSTATE (IF THIS MEDICATION IS CRUSHED PRIOR TO ADMINISTRATION, IT MUST NOT BE HANDLED BY WOMEN WHO ARE PREGNANT OR WHO MAY BECOME PREGNANT) 8/4/20  Yes Historical Provider, MD   famotidine (PEPCID) 20 MG tablet Take 20 mg by mouth daily   Yes Historical Provider, MD   Glucosamine-Chondroit-Vit C-Mn (GLUCOSAMINE CHONDR 500 COMPLEX PO) Take by mouth   Yes Historical Provider, MD   ibuprofen (ADVIL;MOTRIN) 800 MG tablet Take 800 mg by mouth nightly   Yes Historical Provider, MD   terazosin (HYTRIN) 2 MG capsule Take 5 mg by mouth nightly    Yes Historical Provider, MD   vitamin D (CHOLECALCIFEROL) 1000 UNIT TABS tablet Take 1,000 Units by mouth 2 times daily   Yes Historical Provider, MD   atorvastatin (LIPITOR) 40 MG tablet Take 1 tablet by mouth daily. Patient taking differently: Take 40 mg by mouth nightly  7/30/14  Yes Artem Eric MD   aspirin 81 MG tablet Take 81 mg by mouth daily. Yes Historical Provider, MD   clopidogrel (PLAVIX) 75 MG tablet Take 1 tablet by mouth daily. 9/16/13  Yes Artem Eric MD   Multiple Vitamins-Minerals [facility] COMPLETE PO) Take 1 tablet by mouth daily. Yes Historical Provider, MD   diazepam (VALIUM) 10 MG tablet Take 10 mg by mouth nightly. Yes Historical Provider, MD   fluticasone (FLONASE) 50 MCG/ACT nasal spray USE 2 SPRAYS IN EACH NOSTRIL ONCE DAILY FOR NASAL ALLERGY  Patient not taking: Reported on 5/15/2022 4/22/21   Historical Provider, MD   diclofenac sodium (VOLTAREN) 1 % GEL APPLY 4 GM (USE DOSING CARD) TOPICALLY FOUR TIMES A DAY AS NEEDED FOR SPINE AND RIGHT HIP JOINT PAIN (UPS GROUND)  Patient not taking: Reported on 5/15/2022 4/22/21   Historical Provider, MD   nitroGLYCERIN (NITROSTAT) 0.4 MG SL tablet Place 1 tablet under the tongue every 5 minutes as needed. Patient not taking: Reported on 5/15/2022 9/17/13   Artem Eric MD        Allergies:   Other, Codeine, Imdur [isosorbide nitrate], and Vicodin [hydrocodone-acetaminophen]     Review of Systems:   · Constitutional: there has been no unanticipated weight loss. There's been no change in energy level, sleep pattern, or activity level. · Eyes: No visual changes or diplopia. No scleral icterus. · ENT: No Headaches, hearing loss or vertigo. No mouth sores or sore throat. · Cardiovascular: Reviewed in HPI  · Respiratory: No cough or wheezing, no sputum production. No hematemesis. · Gastrointestinal: No abdominal pain, appetite loss, blood in stools. No change in bowel or bladder habits. · Genitourinary: No dysuria, trouble voiding, or hematuria. · Musculoskeletal:  No gait disturbance, weakness or joint complaints. · Integumentary: No rash or pruritis. · Neurological: No headache, diplopia, change in muscle strength, numbness or tingling. No change in gait, balance, coordination, mood, affect, memory, mentation, behavior. · Psychiatric: No anxiety, no depression. · Endocrine: No malaise, fatigue or temperature intolerance. No excessive thirst, fluid intake, or urination. No tremor. · Hematologic/Lymphatic: No abnormal bruising or bleeding, blood clots or swollen lymph nodes. · Allergic/Immunologic: No nasal congestion or hives. Physical Examination:    Vitals:    06/08/22 1450   BP: 134/68   Pulse: 60   SpO2: 97%        Constitutional and General Appearance: NAD   Respiratory:  · Normal excursion and expansion without use of accessory muscles  · Resp Auscultation: Normal breath sounds without dullness  Cardiovascular:  · The apical impulses not displaced  · Heart tones are crisp and normal  · Cervical veins are not engorged  · The carotid upstroke is normal in amplitude and contour without delay or bruit  · Normal S1S2, No S3, No Murmur  · Peripheral pulses are symmetrical and full  · There is no clubbing, cyanosis of the extremities.   · No edema  · Femoral Arteries: 2+ and equal  · Pedal Pulses: 2+ and equal   Abdomen:  · No masses or tenderness  · Liver/Spleen: No Abnormalities Noted  Neurological/Psychiatric:  · Alert and oriented in all spheres  · Moves all extremities well  · Exhibits normal gait balance and coordination  · No abnormalities of mood, affect, memory, mentation, or behavior are noted    Myoview 9/5/13   Moderate sized posterobasal and inferolateral fixed defect consistent with infarction in the territory of the mid and distal LCx and/or RCA . LV function is mildly reduced with inferior hypokinesis and ejection fraction of 48 %. Echocardiogram 9/10/13  Summary  Normal left ventricle size and wall thickness. The left ventricular systolic  function appears reduced with an ejection fraction of 45-50%. There is mild  hypokinesis and myocardial thinning of the posterior wall  Mild mitral, aortic and tricuspid regurgitation. RVSP estimated at 29 mmHg. The left atrium is mildly dilated. Cath 1/2015  LM <20%  LAD 70% mid, FFR 0.66  Cx mid stent patent  %   SVG to RPDA patent  LV: inferior hypokinesis, EF 45%  PTCA LAD  2.25 x 23 AVA    Echocardiogram 2/17/2020  Summary   The left ventricular systolic function is mildly reduced with an ejection   fraction of 45 %. There is akinesis of the basal/mid inferior and basal/mid inferolateral   walls. Normal left ventricular diastolic filling pressure. The left atrium is mildly dilated. The aortic root is mildly dilated at 3.8cm. Mild mitral regurgitation. Mild aortic regurgitation. Systolic pulmonary artery pressure (SPAP) is normal estimated at 12 mmHg   (Right atrial pressure of 3 mmHg). [facility]chocardiogram [facility] 07/09/2021      Echocardiogram [facility] 7/16/2021  Eustachian [place] is noted in the RA and mass growing in the [facility] can not be rules out   Moderate mitral reg, EF 45%     SWETA 08/03/2021   Summary   Left ventricular systolic function is normal with an estimated ejection   fraction of 55%. Prominent Eustachian valve is noted.    Moderate sized sessile echo density on right atrial free wall of unknown   etiology. Small amount of layered plaque in the aorta. There is no evidence of mass or thrombus in the left atrium or appendage. Redundancy of the interatrial septum. A bubble study was performed and showed no evidence of shunting. Mild mitral regurgitation. Mild aortic regurgitation. Recommend cardiac MRI. Cardiac MRI 09/16/2021  Findings are consistent with an ischemic cardiomyopathy with mildly reduced LV function. No RA mass noted. -Normal left ventricular size and systolic function with a calculated ejection fraction of 49% by Lee's method. -Normal right ventricular size and systolic function with a calculated ejection fraction of 59% by Lee's method. -No myocardial edema by T2w imaging. (Normal myocardium/skeletal ratio - normal < 1.9). -Right atrium: there is a prominent Eustachian valve and prominent Chiari network. -No intracardiac shunt. Calculated Qp:Qs:0.98 (Phase contrast velocity encoding Ao/Pa) -Aneurysmal interatrial septum. -Normal myocardial resting perfusion imaging. -On delayed enhancement imaging, there is transmural enhancement involving mid inferior/inferolateral and distal inferior segments. Findings consistent with prior infarction. The MRI sequences and imaging planes in this study were tailored for cardiac imaging and are suboptimal for evaluation of non-cardiac structures. [facility] 05/15/2022  Procedures Performed:   1. Left heart catheterization  2. Selective left and right coronary angiogram  3. Left ventriculography   4. SVG angiogram to the RCA     Procedure Findings:  1. Moderate to severe multi vessel coronary artery diease  2. Normal left ventricular function with EF estimated at 55-60%  3. Normal left heart hemodynamics  4. Patent SVG to the RCA    Findings:     1. Left main coronary artery was normal. It gave off the left anterior descending artery and left circumflex.      2. Left anterior descending 1/2015. Stable. Cath 5/2022 reviewed. HTN - suboptimal   HLD - followed at [place]. Will obtain results   Cardiomyopathy - EF stable at 45%. Possible right atrial mass by [facility] echo and [facility]. MRI w/o mass. Suspect was due to artifact. No symptoms. No further testing  Chronic angina - increased  Side effects due to imdur  FLYNN - may be component of angina   Bradycardia noted in hospital, atenolol reduced - stable     Plan:  Discontinue Imdur  Start amlodipine 5 mg once nightly  Continue lower dose atenolol at 25 mg once daily   Call office in one month to discuss symptoms and blood pressure  Cardiac medications reviewed including indications and pertinent side effects. Medication list updated at this visit. Check blood pressure and heart rate at home a few times per week- keep a log with dates and times and bring to office visit   Regular exercise and following a healthy diet encouraged   Follow up in 6 months     Scribe's attestation: This note was scribed in the presence of Dr. Maranda Jackson M.D. By Tawanna Merino RN     The scribes documentation has been prepared under my direction and personally reviewed by me in its entirety. I confirm that the note above accurately reflects all work, treatment, procedures, and medical decision making performed by me. Dr. Maranda Jackson MD    Thank you for allowing me to participate in the care of this individual.      Ayshacollin Cooney.  Paul Fischer M.D., Cassandra Galidno

## 2022-10-24 ENCOUNTER — TELEPHONE (OUTPATIENT)
Dept: CARDIOLOGY CLINIC | Age: 72
End: 2022-10-24

## 2022-10-24 NOTE — LETTER
[facility] - [address]  Phone: [phone]  Fax: [phone]    Cassandra Muir MD        October 24, 2022     [place] [id number]  1950  To whom it may concern,             Lysle Sender has no cardiac contraindications to surgery. It is recommended that he continue Aspirin. Can stop Plavix for 7 days. If you have any questions or concerns, please don't hesitate to call.     Sincerely,        Cassandra Muir MD

## 2022-10-24 NOTE — TELEPHONE ENCOUNTER
CARDIAC CLEARANCE REQUEST    What type of procedure are you having: Epidural steroid injection     Are you taking any blood thinners: plavix- hold for 7 days   Aspirin- hold for 6 days     Type on anesthesia: none     When is your procedure scheduled for:  not scheduled until clearance is complete     What physician is performing your procedure: Dr. David Shepard    Fax number to send the letter: [phone]

## 2022-11-08 ENCOUNTER — OFFICE VISIT (OUTPATIENT)
Dept: CARDIOLOGY CLINIC | Age: 72
End: 2022-11-08
Payer: OTHER GOVERNMENT

## 2022-11-08 VITALS
WEIGHT: 194 LBS | DIASTOLIC BLOOD PRESSURE: 52 MMHG | BODY MASS INDEX: 30.45 KG/M2 | OXYGEN SATURATION: 96 % | HEART RATE: 65 BPM | HEIGHT: 67 IN | SYSTOLIC BLOOD PRESSURE: 112 MMHG

## 2022-11-08 DIAGNOSIS — I25.110 ATHEROSCLEROSIS OF NATIVE CORONARY ARTERY OF NATIVE HEART WITH UNSTABLE ANGINA PECTORIS (HCC): ICD-10-CM

## 2022-11-08 DIAGNOSIS — I10 PRIMARY HYPERTENSION: ICD-10-CM

## 2022-11-08 PROCEDURE — G8417 CALC BMI ABV UP PARAM F/U: HCPCS | Performed by: INTERNAL MEDICINE

## 2022-11-08 PROCEDURE — 3078F DIAST BP <80 MM HG: CPT | Performed by: INTERNAL MEDICINE

## 2022-11-08 PROCEDURE — 1123F ACP DISCUSS/DSCN MKR DOCD: CPT | Performed by: INTERNAL MEDICINE

## 2022-11-08 PROCEDURE — G8427 DOCREV CUR MEDS BY ELIG CLIN: HCPCS | Performed by: INTERNAL MEDICINE

## 2022-11-08 PROCEDURE — 3074F SYST BP LT 130 MM HG: CPT | Performed by: INTERNAL MEDICINE

## 2022-11-08 PROCEDURE — 3017F COLORECTAL CA SCREEN DOC REV: CPT | Performed by: INTERNAL MEDICINE

## 2022-11-08 PROCEDURE — 1036F TOBACCO NON-USER: CPT | Performed by: INTERNAL MEDICINE

## 2022-11-08 PROCEDURE — G8484 FLU IMMUNIZE NO ADMIN: HCPCS | Performed by: INTERNAL MEDICINE

## 2022-11-08 PROCEDURE — 99214 OFFICE O/P EST MOD 30 MIN: CPT | Performed by: INTERNAL MEDICINE

## 2022-11-08 RX ORDER — AMLODIPINE BESYLATE 5 MG/1
5 TABLET ORAL NIGHTLY
Qty: 90 TABLET | Refills: 3 | Status: SHIPPED | OUTPATIENT
Start: 2022-11-08

## 2022-11-08 NOTE — PATIENT INSTRUCTIONS
Plan:  Cardiac medications reviewed including indications and pertinent side effects. Medication list updated at this visit.    Check blood pressure and heart rate at home a few times per week- keep a log with dates and times and bring to office visit   Regular exercise and following a healthy diet encouraged   Follow up with me in 1 year   Refills sent

## 2022-11-08 NOTE — PROGRESS NOTES
[address]   Cardiac Consultation    Referring Provider:  Cleve Han MD     Chief Complaint   Patient presents with    Follow-up    Hyperlipidemia    Hypertension        Adam Mckenna   1950     History of Present Illness:   Adam Mckenna is a 74 y.o. male who is here today for follow up for a history of coronary artery disease- Multiple PCI of RCA in past including beta radiation. Recurrent restenosis RCA required 1V-CABG - SVG to PDA. PCI/AVA LAD 1/2015, hypertension, hyperlipidemia, cardiomyopathy. Echocardiogram from 2/17/2020 showed an EF of 45% with mild mitral and aortic regurgitation. An echocardiogram from the [facility] 7/16/2021 showed Eustachian Rickford Jim is noted in the RA and mass growing in the [facility] can not be rules out, moderate mitral reg, EF 45%. Since last office visit he had a transesophageal echocardiogram performed on 08/03/2021 showing mild reduced to low normal LVEF, Mild MR  Mild AI, Prominent Eustachian valve, and Moderate size sessile mass attached RA free wall. A cardiac MRI was ordered and completed on 09/16/2021 Findings are consistent with an ischemic cardiomyopathy with mildly reduced LV function. No RA mass noted. Patient was hospitalized from 05/15/2022-05/17/2022 after he presented to ED with complaints of chest pain and shortness of breath. Troponin <0.01 x 3. BNP elevated 723. EKG Sinus rhythm with occasional Premature ventricular complexes Septal infarct, age undeterminedST & T wave abnormality, consider inferior ischemia. He underwent LHC 05/15/2022 for unstable angina showing moderate to severe multivessel CAD, patent saphenous vein graft to RCA. Echocardiogram EF 55-60%, mild AR. Today he states he is feeling well overall. He is having some back pain and plans to have steroid injections soon. He is tolerating his medications and is taking them as prescribed. Stays very active working at home and his [address] No chest pain with exertion.  He has some arthritis pain at times. Feels better when he stays active. Blood pressure is well controlled. Patient currently denies any weight gain, edema, palpitations, chest pain, shortness of breath, dizziness, and syncope. Past Medical History:   has a past medical history of BPH (benign prostatic hyperplasia), CAD (coronary artery disease), Chronic back pain, Gastric ulcer, GERD (gastroesophageal reflux disease), Hyperlipidemia, Hypertension, MI (myocardial infarction) ([address] Osteoarthritis, and Sciatic pain. Surgical History:   has a past surgical history that includes Shoulder arthroscopy (05/2010); Coronary artery bypass graft (2012); Spine surgery; Coronary angioplasty with stent; Intracapsular cataract extraction (Right, 10/11/2019); Intracapsular cataract extraction (Left, 10/25/2019); and Tonsillectomy. Social History:   reports that he quit smoking about 39 years ago. His smoking use included cigarettes. He started smoking about 58 years ago. He smoked an average of 3.5 packs per day. He has never used smokeless tobacco. He reports that he does not drink alcohol and does not use drugs. Family History:  family history includes Heart Disease in his brother, brother, brother, father, mother, and sister; High Blood Pressure in his father and mother. Home Medications:  Prior to Admission medications    Medication Sig Start Date End Date Taking? Authorizing Provider   HYDROcodone-acetaminophen (NORCO) 5-325 MG per tablet Take 1 tablet by mouth 2 times daily as needed for Pain for up to 30 days.  10/24/22 11/23/22 Yes Lesli Scott MD   amLODIPine (NORVASC) 5 MG tablet Take 1 tablet by mouth at bedtime 6/8/22  Yes Melonie Mckeon MD   nitroGLYCERIN (NITROSTAT) 0.4 MG SL tablet Place 1 tablet under the tongue every 5 minutes as needed for Chest pain 6/8/22  Yes Melonie Mckeon MD   atenolol (TENORMIN) 25 MG tablet Take 1 tablet by mouth daily 6/8/22  Yes Melonie Mckeon MD furosemide (LASIX) 20 MG tablet Take 1 tablet by mouth daily as needed (dyspnea, weight gain) 5/17/22  Yes Kelsey Blunt APRN - CNP   docusate sodium (COLACE) 100 MG capsule Take 100 mg by mouth 2 times daily   Yes Historical Provider, MD   valsartan (DIOVAN) 160 MG tablet Take 1 tablet by mouth daily 7/30/21  Yes Brant Cervantes MD   finasteride (PROSCAR) 5 MG tablet TAKE ONE TABLET BY MOUTH ONCE DAILY FOR PROSTATE (IF THIS MEDICATION IS CRUSHED PRIOR TO ADMINISTRATION, IT MUST NOT BE HANDLED BY WOMEN WHO ARE PREGNANT OR WHO MAY BECOME PREGNANT) 8/4/20  Yes Historical Provider, MD   famotidine (PEPCID) 20 MG tablet Take 20 mg by mouth daily   Yes Historical Provider, MD   Glucosamine-Chondroit-Vit C-Mn (GLUCOSAMINE CHONDR 500 COMPLEX PO) Take by mouth   Yes Historical Provider, MD   terazosin (HYTRIN) 2 MG capsule Take 5 mg by mouth nightly    Yes Historical Provider, MD   vitamin D (CHOLECALCIFEROL) 1000 UNIT TABS tablet Take 1,000 Units by mouth 2 times daily   Yes Historical Provider, MD   atorvastatin (LIPITOR) 40 MG tablet Take 1 tablet by mouth daily. Patient taking differently: Take 40 mg by mouth nightly 7/30/14  Yes Brant Cervantes MD   aspirin 81 MG tablet Take 81 mg by mouth daily. Yes Historical Provider, MD   clopidogrel (PLAVIX) 75 MG tablet Take 1 tablet by mouth daily. 9/16/13  Yes Brant Cervantes MD   Multiple Vitamins-Minerals [facility] COMPLETE PO) Take 1 tablet by mouth daily. Yes Historical Provider, MD   diazepam (VALIUM) 10 MG tablet Take 10 mg by mouth nightly.     Yes Historical Provider, MD   fluticasone (FLONASE) 50 MCG/ACT nasal spray USE 2 SPRAYS IN EACH NOSTRIL ONCE DAILY FOR NASAL ALLERGY  Patient not taking: No sig reported 4/22/21   Historical Provider, MD   diclofenac sodium (VOLTAREN) 1 % GEL APPLY 4 GM (USE DOSING CARD) TOPICALLY FOUR TIMES A DAY AS NEEDED FOR SPINE AND RIGHT HIP JOINT PAIN (UPS GROUND)  Patient not taking: No sig reported 4/22/21 Historical Provider, MD   ibuprofen (ADVIL;MOTRIN) 800 MG tablet Take 800 mg by mouth nightly  Patient not taking: Reported on 11/8/2022    Historical Provider, MD        Allergies: Other, Codeine, Imdur [isosorbide nitrate], and Vicodin [hydrocodone-acetaminophen]     Review of Systems:   Constitutional: there has been no unanticipated weight loss. There's been no change in energy level, sleep pattern, or activity level. Eyes: No visual changes or diplopia. No scleral icterus. ENT: No Headaches, hearing loss or vertigo. No mouth sores or sore throat. Cardiovascular: Reviewed in HPI  Respiratory: No cough or wheezing, no sputum production. No hematemesis. Gastrointestinal: No abdominal pain, appetite loss, blood in stools. No change in bowel or bladder habits. Genitourinary: No dysuria, trouble voiding, or hematuria. Musculoskeletal:  No gait disturbance, weakness or joint complaints. Integumentary: No rash or pruritis. Neurological: No headache, diplopia, change in muscle strength, numbness or tingling. No change in gait, balance, coordination, mood, affect, memory, mentation, behavior. Psychiatric: No anxiety, no depression. Endocrine: No malaise, fatigue or temperature intolerance. No excessive thirst, fluid intake, or urination. No tremor. Hematologic/Lymphatic: No abnormal bruising or bleeding, blood clots or swollen lymph nodes. Allergic/Immunologic: No nasal congestion or hives. Physical Examination:    BP (!) 112/52   Pulse 65   Ht 5' 6.5\" (1.689 m)   Wt 194 lb (88 kg)   SpO2 96%   BMI 30.84 kg/m²    Vitals:    11/08/22 0922   BP: (!) 112/52   Pulse:    SpO2:           Constitutional and General Appearance: NAD   Respiratory:  Normal excursion and expansion without use of accessory muscles  Resp Auscultation: Normal breath sounds without dullness  Cardiovascular:   The apical impulses not displaced  Heart tones are crisp and normal  Cervical veins are not engorged  The carotid upstroke is normal in amplitude and contour without delay or bruit  Normal S1S2, No S3, No Murmur  Peripheral pulses are symmetrical and full  There is no clubbing, cyanosis of the extremities. No edema  Femoral Arteries: 2+ and equal  Pedal Pulses: 2+ and equal   Abdomen:  No masses or tenderness  Liver/Spleen: No Abnormalities Noted  Neurological/Psychiatric:  Alert and oriented in all spheres  Moves all extremities well  Exhibits normal gait balance and coordination  No abnormalities of mood, affect, memory, mentation, or behavior are noted    Myoview 9/5/13   Moderate sized posterobasal and inferolateral fixed defect consistent with infarction in the territory of the mid and distal LCx and/or RCA . LV function is mildly reduced with inferior hypokinesis and ejection fraction of 48 %. Echocardiogram 9/10/13  Summary  Normal left ventricle size and wall thickness. The left ventricular systolic  function appears reduced with an ejection fraction of 45-50%. There is mild  hypokinesis and myocardial thinning of the posterior wall  Mild mitral, aortic and tricuspid regurgitation. RVSP estimated at 29 mmHg. The left atrium is mildly dilated. Cath 1/2015  LM <20%  LAD 70% mid, FFR 0.66  Cx mid stent patent  %   SVG to RPDA patent  LV: inferior hypokinesis, EF 45%  PTCA LAD  2.25 x 23 AVA    Echocardiogram 2/17/2020  Summary   The left ventricular systolic function is mildly reduced with an ejection   fraction of 45 %. There is akinesis of the basal/mid inferior and basal/mid inferolateral   walls. Normal left ventricular diastolic filling pressure. The left atrium is mildly dilated. The aortic root is mildly dilated at 3.8cm. Mild mitral regurgitation. Mild aortic regurgitation. Systolic pulmonary artery pressure (SPAP) is normal estimated at 12 mmHg   (Right atrial pressure of 3 mmHg).       [facility] 07[facility]2021      [facility] 7/16/2021  Federico Talbert is noted in the RA and mass growing in the [employer] can not be rules out   Moderate mitral reg, EF 45%     SWETA 08/03/2021   Summary   Left ventricular systolic function is normal with an estimated ejection   fraction of 55%. Prominent Eustachian valve is noted. Moderate sized sessile echo density on right atrial free wall of unknown   etiology. Small amount of layered plaque in the aorta. There is no evidence of mass or thrombus in the left atrium or appendage. Redundancy of the interatrial septum. A bubble study was performed and showed no evidence of shunting. Mild mitral regurgitation. Mild aortic regurgitation. Recommend cardiac MRI. Cardiac MRI 09/16/2021  Findings are consistent with an ischemic cardiomyopathy with mildly reduced LV function. No RA mass noted. -Normal left ventricular size and systolic function with a calculated ejection fraction of 49% by Lee's method. -Normal right ventricular size and systolic function with a calculated ejection fraction of 59% by Lee's method. -No myocardial edema by T2w imaging. (Normal myocardium/skeletal ratio - normal < 1.9). -Right atrium: there is a prominent Eustachian valve and prominent Chiari network. -No intracardiac shunt. Calculated Qp:Qs:0.98 (Phase contrast velocity encoding Ao/Pa) -Aneurysmal interatrial septum. -Normal myocardial resting perfusion imaging. -On delayed enhancement imaging, there is transmural enhancement involving mid inferior/inferolateral and distal inferior segments. Findings consistent with prior infarction. The MRI sequences and imaging planes in this study were tailored for cardiac imaging and are suboptimal for evaluation of non-cardiac structures. [facility] 05/15/2022  Procedures Performed:   1. Left heart catheterization  2. Selective left and right coronary angiogram  3. Left ventriculography   4. SVG angiogram to the RCA     Procedure Findings:  1. Moderate to severe multi vessel coronary artery diease  2. Normal left ventricular function with EF estimated at 55-60%  3. Normal left heart hemodynamics  4. Patent SVG to the RCA    Findings:     1. Left main coronary artery was normal. It gave off the left anterior descending artery and left circumflex. 2. Left anterior descending artery has moderate and very calcific atherosclerotic disease. It was moderate in size. It gave off septal perforators and a moderate sized diagonal branch. The LAD covered the entire apex of the left ventricle. 3. Left circumflex has moderate and very calcific atherosclerotic disease. It was moderate in size. There was a moderate sized obtuse marginal branch. 4. Right coronary artery has severe atherosclerotic disease. It was moderate in size and was the dominant artery. Vessels occluded in the proximal segment. There are previously placed stents throughout the entirety of the right coronary artery. There is no antegrade flow. ~There is a saphenous vein graft is not gauged with the multipurpose catheter. The saphenous vein graft anastomosis into the PDA of the right coronary. There is adequate antegrade and retrograde filling of the right coronary artery. There is noted to be severe diffuse disease of the native right coronary artery. 5. Left ventriculogram showed normal LVEF at 55-60%. Wall motion was normal . There was no significant mitral valve or aortic valve disease noted. LVEDP was normal. There was no gradient noted across the aortic valve during pullback of the catheter. CONCLUSIONS:     1. Moderate to severe multi-vessel coronary artery disease with a patent saphenous vein graft to the RCA. ASSESSMENT/RECOMMENDATIONS:     1. Medical management with emphasis on antianginal therapy. Echocardiogram 05/15/2022   Summary   Normal left ventricular systolic function with ejection fraction of 55-60%. No regional wall motion abnormalites are seen.    Grade I diastolic dysfunction with normal filling pressure. Changes noted from previous echo on 2- in left ventricular function. Mild aortic regurgitation. Latest Reference Range & Units 5/16/22 07:19   CHOLESTEROL, TOTAL, [id number] 0 - 199 mg/dL 146   HDL Cholesterol 40 - 60 mg/dL 53   LDL Calculated <100 mg/dL 68   Triglycerides 0 - 150 mg/dL 124   VLDL Cholesterol Calculated Not Established mg/dL 25           Assessment:   CAD- Multiple PCI of RCA in past including beta radiation. Recurrent restenosis RCA required 1V-CABG - SVG to PDA. PCI/AVA LAD 1/2015. Stable. No angina  HTN - controlled  HLD - stable  Cardiomyopathy - EF stable at 45%. Possible right atrial mass by [facility] echo and [facility]. MRI w/o mass. Suspect was due to artifact. No symptoms. No further testing  Chronic angina - none since last OV  Bradycardia noted in hospital, atenolol reduced - stable     Plan:  Cardiac medications reviewed including indications and pertinent side effects. Medication list updated at this visit. Refill Norvasc. Others through [address] [place] [address] Road blood pressure and heart rate at home a few times per week- keep a log with dates and times and bring to office visit   Regular exercise and following a healthy diet encouraged   Follow up with me in 1 year   Refills sent     Scribe's attestation: This note was scribed in the presence of Dr. Romayne Sicard M.D. By Sánchez Gaines RN    The scribes documentation has been prepared under my direction and personally reviewed by me in its entirety. I confirm that the note above accurately reflects all work, treatment, procedures, and medical decision making performed by me. Dr. Romayne Sicard, MD      Thank you for allowing me to participate in the care of this individual.      Eleanor Pereyra.  Ricki Cranker, M.D., [facility]

## 2022-11-10 ENCOUNTER — HOSPITAL ENCOUNTER (OUTPATIENT)
Age: 72
Setting detail: OUTPATIENT SURGERY
Discharge: HOME | End: 2022-11-10
Attending: PAIN MEDICINE | Admitting: PAIN MEDICINE
Payer: OTHER GOVERNMENT

## 2022-11-10 VITALS
WEIGHT: 190 LBS | BODY MASS INDEX: 30.53 KG/M2 | SYSTOLIC BLOOD PRESSURE: 146 MMHG | RESPIRATION RATE: 16 BRPM | HEART RATE: 54 BPM | HEIGHT: 66 IN | OXYGEN SATURATION: 97 % | TEMPERATURE: 98.1 F | DIASTOLIC BLOOD PRESSURE: 75 MMHG

## 2022-11-10 PROBLEM — M54.16 LUMBAR RADICULOPATHY: Status: ACTIVE | Noted: 2022-11-10

## 2022-11-10 PROCEDURE — 2500000003 HC RX 250 WO HCPCS: Performed by: PAIN MEDICINE

## 2022-11-10 PROCEDURE — 2709999900 HC NON-CHARGEABLE SUPPLY: Performed by: PAIN MEDICINE

## 2022-11-10 PROCEDURE — 64483 NJX AA&/STRD TFRM EPI L/S 1: CPT | Performed by: PAIN MEDICINE

## 2022-11-10 PROCEDURE — 7100000010 HC PHASE II RECOVERY - FIRST 15 MIN: Performed by: PAIN MEDICINE

## 2022-11-10 PROCEDURE — 3600000012 HC SURGERY LEVEL 2 ADDTL 15MIN: Performed by: PAIN MEDICINE

## 2022-11-10 PROCEDURE — 3600000002 HC SURGERY LEVEL 2 BASE: Performed by: PAIN MEDICINE

## 2022-11-10 PROCEDURE — 6360000004 HC RX CONTRAST MEDICATION: Performed by: PAIN MEDICINE

## 2022-11-10 RX ORDER — LIDOCAINE HYDROCHLORIDE 10 MG/ML
INJECTION, SOLUTION EPIDURAL; INFILTRATION; INTRACAUDAL; PERINEURAL PRN
Status: DISCONTINUED | OUTPATIENT
Start: 2022-11-10 | End: 2022-11-10 | Stop reason: ALTCHOICE

## 2022-11-10 ASSESSMENT — PAIN DESCRIPTION - DESCRIPTORS: DESCRIPTORS: DISCOMFORT;ACHING

## 2022-11-10 ASSESSMENT — PAIN - FUNCTIONAL ASSESSMENT
PAIN_FUNCTIONAL_ASSESSMENT: 0-10
PAIN_FUNCTIONAL_ASSESSMENT: PREVENTS OR INTERFERES SOME ACTIVE ACTIVITIES AND ADLS

## 2022-11-10 ASSESSMENT — PAIN SCALES - GENERAL: PAINLEVEL_OUTOF10: 0

## 2022-11-10 NOTE — PROCEDURES
Miguel Sanchez is a 74 y.o. male patient. No diagnosis found. Past Medical History:   Diagnosis Date    BPH (benign prostatic hyperplasia)     CAD (coronary artery disease)     Chronic back pain     Gastric ulcer     past hx    GERD (gastroesophageal reflux disease)     Hyperlipidemia     Hypertension     MI (myocardial infarction) ([facility] [address] 06/1999    Osteoarthritis     Sciatic pain      Blood pressure 130/67, pulse 70, temperature 98.1 °F (36.7 °C), temperature source Temporal, resp. rate 18, height 5' 6\" (1.676 m), weight 190 lb (86.2 kg), SpO2 96 %. Procedures    Enrique Mantilla MD  11/10/2022    TRANSFORAMINAL EPIDURAL AT B L2  LEVELS  14452 and 28570 x  with 23869,     INDICATIONS:  Lumbar Radiculitis 724.4, M54.16  OPERATIVE PROCEDURE:  Consent was signed by the patient after the risks and benefits were explained. With the patient in the prone position, the back was prepped with Prevail and draped. Aseptic technique was used at every stage of the procedure. Oblique fluoroscopic guidance was used to visualize the pedicle of the _L2__ vertebral body on the _L__ side. Skin infiltration was with 0.5 cc of 1% Lidocaine using a 30-gauge needle followed by placement of a _22__ -gauge _5__ -inch needle using oblique fluoroscopic guidance into the transforaminal epidural space way under the pedicle at the medial aspect just lateral to SAP. Final needle position was checked in AP view, which was just lateral to the 6 oclock position on the pedicle. Aspiration was negative for CSF or blood . Injection of Omnipaque dye (0.5 cc) showed appropriate spread along the nerve root and also into the epidural space and _1__ cc of _1__ % Lidocaine with _4.5 mg of CELESTONE   The needle was pulled out intact and discharge instructions were given. ESTIMATED BLOOD LOSS:  Less than 1 cc      Pulse Ox Monitoring was done.      Exact similar procedure was done at _R L2__ level.            ________________________________ Gladis Dixon M.D.

## 2022-11-10 NOTE — DISCHARGE INSTRUCTIONS
[address] [facility]    [phone]    Post Pain Management Injection    PATIENT INSTRUCTIONS:     -Resume Normal Diet  -Other    ACTIVITY:    -No driving or operating machinery for 8 hours post procedure without sedation and 24 hours with sedation. If you are seen driving during this time the proper authorities will be notified.  -Do not stay alone for 4-6 hours after the procedure.  -If you have had IV sedation, do not sign legal documents, make any major decisions, or be involved in work decisions for the remainder of the day. -May shower or bathe.  -Resume normal activity when full movement/sensation has returned in extremities. 3)  SITE CARE:    -Observe puncture site for signs of infection (redness, warmth swelling, drainage with a foul odor, fever or increased tenderness). 4)  EXPECTED SIDE EFFECTS:    -Numbness/tingling/weakness in extremities, if this lasts more than 6 hours notify Dr. Juliocesar Napoles. -Muscle stiffness, soreness at puncture site (soreness may last 2-4 days). DIABETIC PATIENTS ONLY:    -Increased glucose levels in all diabetic patients who have received a steroid injection. -Monitor blood sugars frequently for the first 5 days following procedure.      -Adjust medication accordingly. 6)  TO REACH DR. Suraj Redding: Call [phone]    ADDITIONAL INSTRUCTIONS:    Follow-up as scheduled or call for appointment if not already done. Patients taking Coumadin may resume taking as before the procedure.

## 2023-11-07 ENCOUNTER — OFFICE VISIT (OUTPATIENT)
Dept: CARDIOLOGY CLINIC | Age: 73
End: 2023-11-07
Payer: OTHER GOVERNMENT

## 2023-11-07 VITALS
OXYGEN SATURATION: 96 % | DIASTOLIC BLOOD PRESSURE: 60 MMHG | BODY MASS INDEX: 29.57 KG/M2 | HEIGHT: 66 IN | HEART RATE: 63 BPM | WEIGHT: 184 LBS | SYSTOLIC BLOOD PRESSURE: 132 MMHG

## 2023-11-07 DIAGNOSIS — I25.10 CAD IN NATIVE ARTERY: Primary | ICD-10-CM

## 2023-11-07 PROCEDURE — 3075F SYST BP GE 130 - 139MM HG: CPT | Performed by: INTERNAL MEDICINE

## 2023-11-07 PROCEDURE — G8427 DOCREV CUR MEDS BY ELIG CLIN: HCPCS | Performed by: INTERNAL MEDICINE

## 2023-11-07 PROCEDURE — 3017F COLORECTAL CA SCREEN DOC REV: CPT | Performed by: INTERNAL MEDICINE

## 2023-11-07 PROCEDURE — G8417 CALC BMI ABV UP PARAM F/U: HCPCS | Performed by: INTERNAL MEDICINE

## 2023-11-07 PROCEDURE — 1036F TOBACCO NON-USER: CPT | Performed by: INTERNAL MEDICINE

## 2023-11-07 PROCEDURE — 3078F DIAST BP <80 MM HG: CPT | Performed by: INTERNAL MEDICINE

## 2023-11-07 PROCEDURE — G8484 FLU IMMUNIZE NO ADMIN: HCPCS | Performed by: INTERNAL MEDICINE

## 2023-11-07 PROCEDURE — 1123F ACP DISCUSS/DSCN MKR DOCD: CPT | Performed by: INTERNAL MEDICINE

## 2023-11-07 PROCEDURE — 99214 OFFICE O/P EST MOD 30 MIN: CPT | Performed by: INTERNAL MEDICINE

## 2023-11-07 NOTE — PATIENT INSTRUCTIONS
Plan:  Cardiac medications reviewed including indications and pertinent side effects. Medication list updated at this visit.    Check blood pressure and heart rate at home a few times per week- keep a log with dates and times and bring to office visit   Regular exercise and following a healthy diet encouraged   Follow up with me in 1 year   Labs and refills through the [place]

## 2023-11-07 NOTE — PROGRESS NOTES
Left ventricular systolic function is normal with an estimated ejection   fraction of 55%. Prominent Eustachian valve is noted. Moderate sized sessile echo density on right atrial free wall of unknown   etiology. Small amount of layered plaque in the aorta. There is no evidence of mass or thrombus in the left atrium or appendage. Redundancy of the interatrial septum. A bubble study was performed and showed no evidence of shunting. Mild mitral regurgitation. Mild aortic regurgitation. Recommend cardiac MRI. Cardiac MRI 09/16/2021  Findings are consistent with an ischemic cardiomyopathy with mildly reduced LV function. No RA mass noted. -Normal left ventricular size and systolic function with a calculated ejection fraction of 49% by Lee's method. -Normal right ventricular size and systolic function with a calculated ejection fraction of 59% by Lee's method. -No myocardial edema by T2w imaging. (Normal myocardium/skeletal ratio - normal < 1.9). -Right atrium: there is a prominent Eustachian valve and prominent Chiari network. -No intracardiac shunt. Calculated Qp:Qs:0.98 (Phase contrast velocity encoding Ao/Pa) -Aneurysmal interatrial septum. -Normal myocardial resting perfusion imaging. -On delayed enhancement imaging, there is transmural enhancement involving mid inferior/inferolateral and distal inferior segments. Findings consistent with prior infarction. The MRI sequences and imaging planes in this study were tailored for cardiac imaging and are suboptimal for evaluation of non-cardiac structures. [address] [place] [address] 05/15/2022  Procedures Performed:   1. Left heart catheterization  2. Selective left and right coronary angiogram  3. Left ventriculography   4. SVG angiogram to the RCA     Procedure Findings:  1. Moderate to severe multi vessel coronary artery diease  2. Normal left ventricular function with EF estimated at 55-60%  3. Normal left heart hemodynamics  4.  Patent SVG to the

## 2023-11-08 NOTE — PROCEDURES
Mom aware that parents must remain in surgical services area at all times due to the pt. Being <18    Surgery time instructions match surgical board of 0820    Informed patient for surgery:  no lotions, powder, deodorant or after shave, no make-up, piercings, or jewelry. Fingernails should be clean without nail polish or artificial nails.  If surgery is below the waist avoid a pedicure, no nail polish on toenails.  • Immediately report any new sign of illness to our surgeon’s office  • Masking is optional at our facility (masks will be provided at entrances for those who wish to use one). If you would like teammates to wear a mask, please request this of your care team the day of surgery.  • Our rooms are relatively small and if more than two visitors are    [facility]       Cardiac Catheterization Lab Report    PATIENT: Mihaela Benitez  DATE: 2022  MRN: [id number]  CSN: [id number]  : 1950      Performing Physician: Jaye Jones MD, GAYE, [facility]  Primary Care Physician: Carlee Hightower MD  Admitting/Referring provider: Jaya Archer MD     Procedures Performed:   1. Left heart catheterization  2. Selective left and right coronary angiogram  3. Left ventriculography   4. SVG angiogram to the RCA    Procedure Findings:  1. Moderate to severe multi vessel coronary artery diease  2. Normal left ventricular function with EF estimated at 55-60%  3. Normal left heart hemodynamics  4. Patent SVG to the RCA    Indications:   Patient Active Problem List   Diagnosis    Coronary atherosclerosis of native coronary artery    Other and unspecified hyperlipidemia    S/P CABG x 1    Unstable angina (HCC)    HTN (hypertension)    Right atrial mass    Chest pain, moderate coronary artery risk    Pulmonary vascular congestion       Details:   Mihaela Benitez was brought to the cardiac catheterization lab in a fasting state after informed consent was obtained. If the patient was able to provide written consent, it was obtained. The patient's vitals were monitored through out the procedure. The patient was sedated using the appropriate levels of sedation and ASA guidelines. The appropriate access site area was prepped and drapped in a sterile fashion. The area was anesthetized with 2% lidocaine. Using the modified Seldinger technique, an arterial sheath was introduced into the arterial access site using a single anterior wall puncture. The sheath was flushed and prepped in usual fashion. Catheters used during the procedure included 5 [place] TIG 4.0 and multipurpose catheters. The catheters were advanced and removed over a .035\" wire, into the appropriate positions. Multiple angiographic views were obtained.  An LV gram was obtained. Findings:    1. Left main coronary artery was normal. It gave off the left anterior descending artery and left circumflex. 2. Left anterior descending artery has moderate and very calcific atherosclerotic disease. It was moderate in size. It gave off septal perforators and a moderate sized diagonal branch. The LAD covered the entire apex of the left ventricle. 3. Left circumflex has moderate and very calcific atherosclerotic disease. It was moderate in size. There was a moderate sized obtuse marginal branch. 4. Right coronary artery has severe atherosclerotic disease. It was moderate in size and was the dominant artery. Vessels occluded in the proximal segment. There are previously placed stents throughout the entirety of the right coronary artery. There is no antegrade flow. ~There is a saphenous vein graft is not gauged with the multipurpose catheter. The saphenous vein graft anastomosis into the PDA of the right coronary. There is adequate antegrade and retrograde filling of the right coronary artery. There is noted to be severe diffuse disease of the native right coronary artery. 5. Left ventriculogram showed normal LVEF at 55-60%. Wall motion was normal . There was no significant mitral valve or aortic valve disease noted. LVEDP was normal. There was no gradient noted across the aortic valve during pullback of the catheter. BP (!) 140/74   Pulse 55   Temp 97.9 °F (36.6 °C) (Oral)   Resp 18   Ht 5' 6.5\" (1.689 m)   Wt 198 lb 1.6 oz (89.9 kg)   SpO2 95%   BMI 31.50 kg/m²     The access site was controlled with manual pressure and/or appropriate closure device. Moderate Conscious Sedation Details: An independent trained observer pushed medications at my direction. We monitoring the patient's level of consciousness and vital signs/physiologic status throughout the procedure.   CPT codes 14111 and 18388    Start time: 3030  Stop time: 1232  ASA class: 3    Sedation totals:  Versad - 2mg  Fentanyl - 50mcg    EBL - minimal <5 cc blood loss    The patient was monitored continuously with the ECG, pulse oximetry, blood pressure, and direct observation. CONCLUSIONS:    1. Moderate to severe multi-vessel coronary artery disease with a patent saphenous vein graft to the RCA. ASSESSMENT/RECOMMENDATIONS:    1. Medical management with emphasis on antianginal therapy.       Lena Marroquin MD, GAYE, [place] [address] Cardiology  [facility]  [phone] Main central office  [phone] [facility] office  5/16/2022  12:43 PM

## 2024-01-29 ENCOUNTER — PATIENT MESSAGE (OUTPATIENT)
Dept: CARDIOLOGY CLINIC | Age: 74
End: 2024-01-29

## 2024-01-29 DIAGNOSIS — I25.110 ATHEROSCLEROSIS OF NATIVE CORONARY ARTERY OF NATIVE HEART WITH UNSTABLE ANGINA PECTORIS (HCC): ICD-10-CM

## 2024-01-29 DIAGNOSIS — I10 PRIMARY HYPERTENSION: ICD-10-CM

## 2024-01-29 RX ORDER — AMLODIPINE BESYLATE 5 MG/1
5 TABLET ORAL NIGHTLY
Qty: 90 TABLET | Refills: 3 | Status: SHIPPED | OUTPATIENT
Start: 2024-01-29

## 2024-01-29 NOTE — TELEPHONE ENCOUNTER
From: Reyes Ignacio  To: Dr. Blake Gomez  Sent: 1/29/2024 3:47 PM EST  Subject: Amlopidine 5mg    I need a refill for this prescription called into [facility] at [phone]. My current prescription ran out in November.   Thank you    Reyes Ignacio  [phone]

## 2024-03-29 ENCOUNTER — TELEPHONE (OUTPATIENT)
Dept: CARDIOLOGY CLINIC | Age: 74
End: 2024-03-29

## 2024-03-29 NOTE — TELEPHONE ENCOUNTER
Chart reviewed.  History of coronary artery disease status post prior PCI as well as one-vessel bypass.  Stable anatomy per 2022 angiogram.  Stable from recent OV.  Low risk procedure from a cardiovascular standpoint.  Typically would hold antiplatelet therapy at the discretion of the procedure MD.  Plavix should be held for 5 days prior to procedure.  If aspirin needs held this may be held for 1 week prior to the procedure.  Restart as soon as possible postprocedure.  Let me know if additional questions.  Thank you

## 2024-03-29 NOTE — TELEPHONE ENCOUNTER
CARDIAC CLEARANCE REQUEST    What type of procedure are you having:  Epidural Steroid Injection in the Spine  Are you taking any blood thinners:  Aspirin, Plavix  Type on anesthesia:  Local  When is your procedure scheduled for:  5/15/2024  What physician is performing your procedure:  [facility]  Phone Number:  [phone]  Fax number to send the letter:   Not sure, Liv stated she'd call back with a good fax number.     Liv, a clinical pharmacist with [facility] called asking if clopidogrel (plavix) needs to be held and if yes, then how long. Please advise.     LOV 11/07/2023 [facility]  No upcoming apt

## 2024-07-30 PROBLEM — M51.26 DISC DISPLACEMENT, LUMBAR: Status: ACTIVE | Noted: 2024-07-30

## 2024-07-30 PROBLEM — M48.061 SPINAL STENOSIS OF LUMBAR REGION: Status: ACTIVE | Noted: 2024-07-30

## 2024-07-30 PROBLEM — M47.816 LUMBAR FACET ARTHROPATHY: Status: ACTIVE | Noted: 2024-07-30

## 2024-07-30 PROBLEM — G89.4 CHRONIC PAIN SYNDROME: Status: ACTIVE | Noted: 2024-07-30

## 2024-11-06 NOTE — PROGRESS NOTES
Amount: 1 tablet 11/23/24 12/23/24  Elva Linares APRN - CNP   naloxone 4 MG/0.1ML LIQD nasal spray 1 spray by Nasal route as needed for Opioid Reversal 7/30/24   Elva Linares APRN - CNP   amLODIPine (NORVASC) 5 MG tablet Take 1 tablet by mouth at bedtime 1/29/24   Blake Gomez MD   nitroGLYCERIN (NITROSTAT) 0.4 MG SL tablet Place 1 tablet under the tongue every 5 minutes as needed for Chest pain 6/8/22   Blake Gomez MD   atenolol (TENORMIN) 25 MG tablet Take 1 tablet by mouth daily 6/8/22   Blake Gomez MD   furosemide (LASIX) 20 MG tablet Take 1 tablet by mouth daily as needed (dyspnea, weight gain) 5/17/22   Kelsey Ruelas APRN - CNP   docusate sodium (COLACE) 100 MG capsule Take 1 capsule by mouth 2 times daily    Fatoumata Barger MD   valsartan (DIOVAN) 160 MG tablet Take 1 tablet by mouth daily 7/30/21   Blake Gomez MD   finasteride (PROSCAR) 5 MG tablet TAKE ONE TABLET BY MOUTH ONCE DAILY FOR PROSTATE (IF THIS MEDICATION IS CRUSHED PRIOR TO ADMINISTRATION, IT MUST NOT BE HANDLED BY WOMEN WHO ARE PREGNANT OR WHO MAY BECOME PREGNANT) 8/4/20   Fatoumata Barger MD   fluticasone (FLONASE) 50 MCG/ACT nasal spray USE 2 SPRAYS IN EACH NOSTRIL ONCE DAILY FOR NASAL ALLERGY 4/22/21   Fatoumata Barger MD   diclofenac sodium (VOLTAREN) 1 % GEL APPLY 4 GM (USE DOSING CARD) TOPICALLY FOUR TIMES A DAY AS NEEDED FOR SPINE AND RIGHT HIP JOINT PAIN (UPS GROUND) 4/22/21   Fatoumata Barger MD   famotidine (PEPCID) 20 MG tablet Take 1 tablet by mouth daily    Fatoumata Barger MD   Glucosamine-Chondroit-Vit C-Mn (GLUCOSAMINE CHONDR 500 COMPLEX PO) Take by mouth    Fatoumata Barger MD   ibuprofen (ADVIL;MOTRIN) 800 MG tablet Take 1 tablet by mouth nightly    Fatoumata Barger MD   terazosin (HYTRIN) 2 MG capsule Take 5 mg by mouth nightly     Fatoumata Barger MD   vitamin D (CHOLECALCIFEROL) 1000 UNIT TABS tablet Take 1 tablet by mouth 2 times daily

## 2024-11-11 ENCOUNTER — OFFICE VISIT (OUTPATIENT)
Dept: CARDIOLOGY CLINIC | Age: 74
End: 2024-11-11

## 2024-11-11 VITALS
HEART RATE: 64 BPM | BODY MASS INDEX: 30.45 KG/M2 | HEIGHT: 67 IN | WEIGHT: 194 LBS | OXYGEN SATURATION: 96 % | DIASTOLIC BLOOD PRESSURE: 64 MMHG | SYSTOLIC BLOOD PRESSURE: 126 MMHG

## 2024-11-11 DIAGNOSIS — I25.110 ATHEROSCLEROSIS OF NATIVE CORONARY ARTERY OF NATIVE HEART WITH UNSTABLE ANGINA PECTORIS (HCC): ICD-10-CM

## 2024-11-11 DIAGNOSIS — I10 PRIMARY HYPERTENSION: ICD-10-CM

## 2024-11-11 RX ORDER — AMLODIPINE BESYLATE 5 MG/1
5 TABLET ORAL NIGHTLY
Qty: 90 TABLET | Refills: 3
Start: 2024-11-11

## 2024-11-11 RX ORDER — ATORVASTATIN CALCIUM 40 MG/1
40 TABLET, FILM COATED ORAL NIGHTLY
Qty: 30 TABLET | Refills: 0 | Status: SHIPPED
Start: 2024-11-11

## 2024-11-11 NOTE — PATIENT INSTRUCTIONS
Plan:  [facility]   Cardiac medications reviewed including indications and pertinent side effects. Medication list updated at this visit.   Patient verbalizes understanding of the need for treatment and education has been provided at today's visit. Additional education material will be provided in after visit summary.    Check blood pressure and heart rate at home a few times per week- keep a log with dates and times and bring to office visit   Regular exercise and following a healthy diet encouraged   Follow up with me in 1 year.

## 2025-06-23 ENCOUNTER — TELEPHONE (OUTPATIENT)
Dept: CARDIOLOGY CLINIC | Age: 75
End: 2025-06-23

## 2025-06-23 NOTE — TELEPHONE ENCOUNTER
Ask if cough or fever. If yes, should go to urgent care or PCP  If no, we can see him at 3pm tomorrow - there is open spot

## 2025-06-23 NOTE — TELEPHONE ENCOUNTER
PT called stating that he has been SOB. He gets SOB from walking up stairs and just from rolling over in bed at night. Is there an OB we can offer to get him seen sooner than November?

## 2025-06-24 ENCOUNTER — TELEPHONE (OUTPATIENT)
Dept: CARDIOLOGY CLINIC | Age: 75
End: 2025-06-24

## 2025-06-24 ENCOUNTER — OFFICE VISIT (OUTPATIENT)
Dept: CARDIOLOGY CLINIC | Age: 75
End: 2025-06-24
Payer: MEDICARE

## 2025-06-24 VITALS
DIASTOLIC BLOOD PRESSURE: 62 MMHG | OXYGEN SATURATION: 98 % | HEIGHT: 66 IN | SYSTOLIC BLOOD PRESSURE: 112 MMHG | BODY MASS INDEX: 30.62 KG/M2 | HEART RATE: 66 BPM | WEIGHT: 190.5 LBS

## 2025-06-24 DIAGNOSIS — E78.00 PURE HYPERCHOLESTEROLEMIA: ICD-10-CM

## 2025-06-24 DIAGNOSIS — R06.02 SOB (SHORTNESS OF BREATH): ICD-10-CM

## 2025-06-24 DIAGNOSIS — I10 PRIMARY HYPERTENSION: ICD-10-CM

## 2025-06-24 DIAGNOSIS — I25.110 ATHEROSCLEROSIS OF NATIVE CORONARY ARTERY OF NATIVE HEART WITH UNSTABLE ANGINA PECTORIS (HCC): Primary | ICD-10-CM

## 2025-06-24 DIAGNOSIS — R07.2 PRECORDIAL PAIN: ICD-10-CM

## 2025-06-24 DIAGNOSIS — Z95.1 S/P CABG X 1: ICD-10-CM

## 2025-06-24 DIAGNOSIS — I20.9 ANGINA, CLASS IV: ICD-10-CM

## 2025-06-24 PROCEDURE — 99214 OFFICE O/P EST MOD 30 MIN: CPT | Performed by: INTERNAL MEDICINE

## 2025-06-24 PROCEDURE — 3074F SYST BP LT 130 MM HG: CPT | Performed by: INTERNAL MEDICINE

## 2025-06-24 PROCEDURE — 1123F ACP DISCUSS/DSCN MKR DOCD: CPT | Performed by: INTERNAL MEDICINE

## 2025-06-24 PROCEDURE — 93000 ELECTROCARDIOGRAM COMPLETE: CPT | Performed by: INTERNAL MEDICINE

## 2025-06-24 PROCEDURE — 1159F MED LIST DOCD IN RCRD: CPT | Performed by: INTERNAL MEDICINE

## 2025-06-24 PROCEDURE — 3078F DIAST BP <80 MM HG: CPT | Performed by: INTERNAL MEDICINE

## 2025-06-24 RX ORDER — RANOLAZINE 500 MG/1
500 TABLET, EXTENDED RELEASE ORAL 2 TIMES DAILY
Qty: 60 TABLET | Refills: 5 | Status: SHIPPED | OUTPATIENT
Start: 2025-06-24

## 2025-06-24 NOTE — PROGRESS NOTES
[facility]   Cardiac follow up     Referring Provider:  Daljit Sparks MD     Chief Complaint   Patient presents with    Follow-up    Coronary Artery Disease    Hyperlipidemia    Hypertension    Shortness of Breath        Reyes Ignacio   1950     History of Present Illness:   Reyes Ignacio is a 75 y.o. male who is here today for follow up for a history of coronary artery disease- Multiple PCI of RCA in past including beta radiation. Recurrent restenosis RCA required 1V-CABG - SVG to PDA. PCI/AVA LAD 1/2015, hypertension, hyperlipidemia, cardiomyopathy. Echocardiogram from 2/17/2020 showed an EF of 45% with mild mitral and aortic regurgitation. [facility] showed Eustachian Shena is noted in the RA and mass growing in the Eustachian Shena can not be rules out, moderate mitral reg, EF 45%. He had a transesophageal echocardiogram performed on 08/03/2021 showing mild reduced to low normal LVEF, Mild MR, Mild AI, Prominent Eustachian valve, and Moderate size sessile mass attached RA free wall. A cardiac MRI was ordered and completed on 09/16/2021 Findings are consistent with an ischemic cardiomyopathy with mildly reduced LV function. No RA mass noted.   Patient was hospitalized from 05/15/2022-05/17/2022 after he presented to ED with complaints of chest pain and shortness of breath. Troponin <0.01 x 3. BNP elevated 723. EKG Sinus rhythm with occasional Premature ventricular complexes Septal infarct, age undeterminedST & T wave abnormality, consider inferior ischemia. He underwent LHC 05/15/2022 for unstable angina showing moderate to severe multivessel CAD, patent saphenous vein graft to RCA. Echocardiogram EF 55-60%, mild AR.    Today he states he has been feeling SOB with walking and also just rolling over in bed. States his SOB started 2 months ago. States while in vacation in [place] and felt SOB and had trouble keeping up with others on his bike. He states he also has pain in

## 2025-06-24 NOTE — TELEPHONE ENCOUNTER
Cardiac Cath orders: be specific     Left heart cath possible PCI     Ordering Provider: [facility]  Performing Provider: (If not ordering provider)  Length of time for procedure: 1 hour   IV Sedation: (Yes   Reps needed: NA  Specific equip needed: NA  Medications to hold:  Lasix and [place]     Pt aware of meds to hold?: (Yes   Urgent? (Need time frame): 1 week   (All echos should be completed prior to the scheduled procedure date, preferably [place] or other outreach)

## 2025-06-24 NOTE — PATIENT INSTRUCTIONS
~Start Ranexa 500 mg twice a day   ~We discussed an angiogram VS a stress test  ~He would like to proceed with an angiogram   ~Hold Lasix and Norco, the morning of the procedure. You can take all your other medications the morning of the procedure   ~You will get a call to get this set up   Labs- CMP, Fasting lipids, CBC, TSH, mag level,       Cardiac medications reviewed including indications and pertinent side effects. Medication list updated at this visit.   Patient verbalizes understanding of the need for treatment and education has been provided at today's visit. Additional education material will be provided in after visit summary.    Check blood pressure and heart rate at home a few times per week- keep a log with dates and times and bring to office visit   Regular exercise and following a healthy diet encouraged   Follow up with me in November

## 2025-06-25 DIAGNOSIS — Z95.1 S/P CABG X 1: ICD-10-CM

## 2025-06-25 DIAGNOSIS — R07.2 PRECORDIAL PAIN: ICD-10-CM

## 2025-06-25 DIAGNOSIS — I20.9 ANGINA, CLASS IV: ICD-10-CM

## 2025-06-25 DIAGNOSIS — R06.02 SOB (SHORTNESS OF BREATH): ICD-10-CM

## 2025-06-25 DIAGNOSIS — I10 PRIMARY HYPERTENSION: ICD-10-CM

## 2025-06-25 DIAGNOSIS — I20.89 ANGINAL EQUIVALENT: ICD-10-CM

## 2025-06-25 LAB
ALBUMIN SERPL-MCNC: 4.1 G/DL (ref 3.4–5)
ALBUMIN/GLOB SERPL: 2.1 {RATIO} (ref 1.1–2.2)
ALP SERPL-CCNC: 36 U/L (ref 40–129)
ALT SERPL-CCNC: 24 U/L (ref 10–40)
ANION GAP SERPL CALCULATED.3IONS-SCNC: 10 MMOL/L (ref 3–16)
AST SERPL-CCNC: 23 U/L (ref 15–37)
BILIRUB SERPL-MCNC: 0.4 MG/DL (ref 0–1)
BUN SERPL-MCNC: 35 MG/DL (ref 7–20)
CALCIUM SERPL-MCNC: 9.5 MG/DL (ref 8.3–10.6)
CHLORIDE SERPL-SCNC: 111 MMOL/L (ref 99–110)
CHOLEST SERPL-MCNC: 134 MG/DL (ref 0–199)
CO2 SERPL-SCNC: 22 MMOL/L (ref 21–32)
CREAT SERPL-MCNC: 1.5 MG/DL (ref 0.8–1.3)
DEPRECATED RDW RBC AUTO: 13.4 % (ref 12.4–15.4)
GFR SERPLBLD CREATININE-BSD FMLA CKD-EPI: 48 ML/MIN/{1.73_M2}
GLUCOSE SERPL-MCNC: 115 MG/DL (ref 70–99)
HCT VFR BLD AUTO: 34.3 % (ref 40.5–52.5)
HDLC SERPL-MCNC: 59 MG/DL (ref 40–60)
HGB BLD-MCNC: 12.2 G/DL (ref 13.5–17.5)
LDLC SERPL CALC-MCNC: 58 MG/DL
MAGNESIUM SERPL-MCNC: 2.29 MG/DL (ref 1.8–2.4)
MCH RBC QN AUTO: 34.4 PG (ref 26–34)
MCHC RBC AUTO-ENTMCNC: 35.6 G/DL (ref 31–36)
MCV RBC AUTO: 96.7 FL (ref 80–100)
PLATELET # BLD AUTO: 151 K/UL (ref 135–450)
PMV BLD AUTO: 7.7 FL (ref 5–10.5)
POTASSIUM SERPL-SCNC: 4.6 MMOL/L (ref 3.5–5.1)
PROT SERPL-MCNC: 6.1 G/DL (ref 6.4–8.2)
RBC # BLD AUTO: 3.54 M/UL (ref 4.2–5.9)
SODIUM SERPL-SCNC: 143 MMOL/L (ref 136–145)
TRIGL SERPL-MCNC: 86 MG/DL (ref 0–150)
TSH SERPL DL<=0.005 MIU/L-ACNC: 3.46 UIU/ML (ref 0.27–4.2)
VLDLC SERPL CALC-MCNC: 17 MG/DL
WBC # BLD AUTO: 4.6 K/UL (ref 4–11)

## 2025-06-26 ENCOUNTER — RESULTS FOLLOW-UP (OUTPATIENT)
Dept: CARDIOLOGY CLINIC | Age: 75
End: 2025-06-26

## 2025-06-26 NOTE — TELEPHONE ENCOUNTER
PT returned call. MSG relayed pt V/u.   Pt also wanted [facility] [place] [facility] and [facility] are [facility] his labs as well.

## 2025-07-02 NOTE — TELEPHONE ENCOUNTER
Pt called and stated insurance has approved procedure and he is ready to schedule.  Best call back number is [phone]

## 2025-07-03 PROBLEM — I20.89 ANGINAL EQUIVALENT: Status: ACTIVE | Noted: 2025-06-25

## 2025-07-03 PROBLEM — R07.9 CHEST PAIN: Status: ACTIVE | Noted: 2025-06-25

## 2025-07-03 PROBLEM — R06.02 SOB (SHORTNESS OF BREATH): Status: ACTIVE | Noted: 2025-06-25

## 2025-07-03 PROBLEM — I20.9 ANGINA, CLASS IV: Status: ACTIVE | Noted: 2025-06-25

## 2025-07-03 NOTE — TELEPHONE ENCOUNTER
Procedure:  [facility]  Doctor:  Dr. Spann  Date:  7/8/25  Time:  8am  Arrival:  6:30am  Reps:  n/a  Anesthesia:  n/a      Spoke with patient. Please have patient arrive to the main entrance of [facility] ([address] [place], [place] [id number]) and check in with the registration desk.  They will be directed to the Cath Lab. Remind patient to be NPO after midnight (8 hours prior). Do not apply lotions/creams on skin the day of procedure.    Instructions [facility] thru [facility].    [facility] - fyi only.

## 2025-07-08 ENCOUNTER — TELEPHONE (OUTPATIENT)
Dept: CARDIAC CATH/INVASIVE PROCEDURES | Age: 75
End: 2025-07-08

## 2025-07-08 ENCOUNTER — HOSPITAL ENCOUNTER (OUTPATIENT)
Age: 75
Discharge: HOME | End: 2025-07-08
Attending: INTERNAL MEDICINE | Admitting: INTERNAL MEDICINE
Payer: MEDICARE

## 2025-07-08 VITALS
RESPIRATION RATE: 18 BRPM | HEART RATE: 53 BPM | HEIGHT: 67 IN | SYSTOLIC BLOOD PRESSURE: 118 MMHG | BODY MASS INDEX: 29.74 KG/M2 | OXYGEN SATURATION: 95 % | WEIGHT: 189.5 LBS | DIASTOLIC BLOOD PRESSURE: 59 MMHG

## 2025-07-08 DIAGNOSIS — R07.9 CHEST PAIN: ICD-10-CM

## 2025-07-08 DIAGNOSIS — I10 PRIMARY HYPERTENSION: ICD-10-CM

## 2025-07-08 DIAGNOSIS — I20.89 ANGINAL EQUIVALENT: ICD-10-CM

## 2025-07-08 DIAGNOSIS — R06.02 SOB (SHORTNESS OF BREATH): ICD-10-CM

## 2025-07-08 DIAGNOSIS — I25.110 ATHEROSCLEROSIS OF NATIVE CORONARY ARTERY OF NATIVE HEART WITH UNSTABLE ANGINA PECTORIS (HCC): ICD-10-CM

## 2025-07-08 DIAGNOSIS — I20.9 ANGINA, CLASS IV: ICD-10-CM

## 2025-07-08 LAB
ANION GAP SERPL CALCULATED.3IONS-SCNC: 10 MMOL/L (ref 3–16)
BUN SERPL-MCNC: 37 MG/DL (ref 7–20)
CALCIUM SERPL-MCNC: 9.8 MG/DL (ref 8.3–10.6)
CHLORIDE SERPL-SCNC: 107 MMOL/L (ref 99–110)
CHOLEST SERPL-MCNC: 146 MG/DL (ref 0–199)
CO2 SERPL-SCNC: 23 MMOL/L (ref 21–32)
CREAT SERPL-MCNC: 1.5 MG/DL (ref 0.8–1.3)
DEPRECATED RDW RBC AUTO: 13.2 % (ref 12.4–15.4)
EKG ATRIAL RATE: 56 BPM
EKG ATRIAL RATE: 56 BPM
EKG DIAGNOSIS: NORMAL
EKG DIAGNOSIS: NORMAL
EKG P AXIS: 26 DEGREES
EKG P AXIS: 37 DEGREES
EKG P-R INTERVAL: 158 MS
EKG P-R INTERVAL: 180 MS
EKG Q-T INTERVAL: 440 MS
EKG Q-T INTERVAL: 462 MS
EKG QRS DURATION: 104 MS
EKG QRS DURATION: 96 MS
EKG QTC CALCULATION (BAZETT): 424 MS
EKG QTC CALCULATION (BAZETT): 445 MS
EKG R AXIS: -15 DEGREES
EKG R AXIS: -18 DEGREES
EKG T AXIS: -30 DEGREES
EKG T AXIS: 90 DEGREES
EKG VENTRICULAR RATE: 56 BPM
EKG VENTRICULAR RATE: 56 BPM
GFR SERPLBLD CREATININE-BSD FMLA CKD-EPI: 48 ML/MIN/{1.73_M2}
GLUCOSE SERPL-MCNC: 122 MG/DL (ref 70–99)
HCT VFR BLD AUTO: 34.5 % (ref 40.5–52.5)
HDLC SERPL-MCNC: 73 MG/DL (ref 40–60)
HGB BLD-MCNC: 12.1 G/DL (ref 13.5–17.5)
LDLC SERPL CALC-MCNC: 63 MG/DL
MCH RBC QN AUTO: 34.7 PG (ref 26–34)
MCHC RBC AUTO-ENTMCNC: 35 G/DL (ref 31–36)
MCV RBC AUTO: 99.1 FL (ref 80–100)
PLATELET # BLD AUTO: 179 K/UL (ref 135–450)
PMV BLD AUTO: 6.8 FL (ref 5–10.5)
POC ACT LR: 246 SEC
POC ACT LR: 292 SEC
POC ACT LR: 355 SEC
POC ACT LR: >400 SEC
POTASSIUM SERPL-SCNC: 4.7 MMOL/L (ref 3.5–5.1)
RBC # BLD AUTO: 3.48 M/UL (ref 4.2–5.9)
SODIUM SERPL-SCNC: 140 MMOL/L (ref 136–145)
TRIGL SERPL-MCNC: 50 MG/DL (ref 0–150)
VLDLC SERPL CALC-MCNC: 10 MG/DL
WBC # BLD AUTO: 5.4 K/UL (ref 4–11)

## 2025-07-08 PROCEDURE — C1874 STENT, COATED/COV W/DEL SYS: HCPCS | Performed by: INTERNAL MEDICINE

## 2025-07-08 PROCEDURE — 99153 MOD SED SAME PHYS/QHP EA: CPT | Performed by: INTERNAL MEDICINE

## 2025-07-08 PROCEDURE — 2580000003 HC RX 258: Performed by: INTERNAL MEDICINE

## 2025-07-08 PROCEDURE — C1887 CATHETER, GUIDING: HCPCS | Performed by: INTERNAL MEDICINE

## 2025-07-08 PROCEDURE — 80061 LIPID PANEL: CPT

## 2025-07-08 PROCEDURE — 7100000011 HC PHASE II RECOVERY - ADDTL 15 MIN: Performed by: INTERNAL MEDICINE

## 2025-07-08 PROCEDURE — 2500000003 HC RX 250 WO HCPCS: Performed by: INTERNAL MEDICINE

## 2025-07-08 PROCEDURE — 93005 ELECTROCARDIOGRAM TRACING: CPT | Performed by: INTERNAL MEDICINE

## 2025-07-08 PROCEDURE — 7100000010 HC PHASE II RECOVERY - FIRST 15 MIN: Performed by: INTERNAL MEDICINE

## 2025-07-08 PROCEDURE — 85347 COAGULATION TIME ACTIVATED: CPT

## 2025-07-08 PROCEDURE — 85027 COMPLETE CBC AUTOMATED: CPT

## 2025-07-08 PROCEDURE — 6370000000 HC RX 637 (ALT 250 FOR IP): Performed by: INTERNAL MEDICINE

## 2025-07-08 PROCEDURE — C1894 INTRO/SHEATH, NON-LASER: HCPCS | Performed by: INTERNAL MEDICINE

## 2025-07-08 PROCEDURE — 6360000002 HC RX W HCPCS: Performed by: INTERNAL MEDICINE

## 2025-07-08 PROCEDURE — 93458 L HRT ARTERY/VENTRICLE ANGIO: CPT | Performed by: INTERNAL MEDICINE

## 2025-07-08 PROCEDURE — 80048 BASIC METABOLIC PNL TOTAL CA: CPT

## 2025-07-08 PROCEDURE — 93010 ELECTROCARDIOGRAM REPORT: CPT | Performed by: INTERNAL MEDICINE

## 2025-07-08 PROCEDURE — C1725 CATH, TRANSLUMIN NON-LASER: HCPCS | Performed by: INTERNAL MEDICINE

## 2025-07-08 PROCEDURE — C1769 GUIDE WIRE: HCPCS | Performed by: INTERNAL MEDICINE

## 2025-07-08 PROCEDURE — C9600 PERC DRUG-EL COR STENT SING: HCPCS | Performed by: INTERNAL MEDICINE

## 2025-07-08 PROCEDURE — 6360000004 HC RX CONTRAST MEDICATION: Performed by: INTERNAL MEDICINE

## 2025-07-08 PROCEDURE — 2709999900 HC NON-CHARGEABLE SUPPLY: Performed by: INTERNAL MEDICINE

## 2025-07-08 PROCEDURE — 99152 MOD SED SAME PHYS/QHP 5/>YRS: CPT | Performed by: INTERNAL MEDICINE

## 2025-07-08 DEVICE — EVEROLIMUS-ELUTING PLATINUM CHROMIUM CORONARY STENT SYSTEM
Type: IMPLANTABLE DEVICE | Status: FUNCTIONAL
Brand: SYNERGY™ XD

## 2025-07-08 RX ORDER — FENTANYL CITRATE 50 UG/ML
INJECTION, SOLUTION INTRAMUSCULAR; INTRAVENOUS PRN
Status: DISCONTINUED | OUTPATIENT
Start: 2025-07-08 | End: 2025-07-08 | Stop reason: HOSPADM

## 2025-07-08 RX ORDER — ONDANSETRON 2 MG/ML
4 INJECTION INTRAMUSCULAR; INTRAVENOUS EVERY 6 HOURS PRN
Status: DISCONTINUED | OUTPATIENT
Start: 2025-07-08 | End: 2025-07-08 | Stop reason: HOSPADM

## 2025-07-08 RX ORDER — ASPIRIN 325 MG
325 TABLET ORAL ONCE
Status: DISCONTINUED | OUTPATIENT
Start: 2025-07-08 | End: 2025-07-08 | Stop reason: HOSPADM

## 2025-07-08 RX ORDER — CLOPIDOGREL 300 MG/1
TABLET, FILM COATED ORAL PRN
Status: DISCONTINUED | OUTPATIENT
Start: 2025-07-08 | End: 2025-07-08 | Stop reason: HOSPADM

## 2025-07-08 RX ORDER — IOPAMIDOL 755 MG/ML
INJECTION, SOLUTION INTRAVASCULAR PRN
Status: DISCONTINUED | OUTPATIENT
Start: 2025-07-08 | End: 2025-07-08 | Stop reason: HOSPADM

## 2025-07-08 RX ORDER — SODIUM CHLORIDE 9 MG/ML
INJECTION, SOLUTION INTRAVENOUS PRN
Status: DISCONTINUED | OUTPATIENT
Start: 2025-07-08 | End: 2025-07-08 | Stop reason: HOSPADM

## 2025-07-08 RX ORDER — MIDAZOLAM HYDROCHLORIDE 1 MG/ML
INJECTION, SOLUTION INTRAMUSCULAR; INTRAVENOUS PRN
Status: DISCONTINUED | OUTPATIENT
Start: 2025-07-08 | End: 2025-07-08 | Stop reason: HOSPADM

## 2025-07-08 RX ORDER — LORAZEPAM 0.5 MG/1
0.5 TABLET ORAL
Status: DISCONTINUED | OUTPATIENT
Start: 2025-07-08 | End: 2025-07-08 | Stop reason: HOSPADM

## 2025-07-08 RX ORDER — AMLODIPINE BESYLATE 5 MG/1
10 TABLET ORAL NIGHTLY
Qty: 90 TABLET | Refills: 2 | Status: SHIPPED | OUTPATIENT
Start: 2025-07-08

## 2025-07-08 RX ORDER — ACETAMINOPHEN 325 MG/1
650 TABLET ORAL EVERY 4 HOURS PRN
Status: DISCONTINUED | OUTPATIENT
Start: 2025-07-08 | End: 2025-07-08 | Stop reason: HOSPADM

## 2025-07-08 RX ORDER — SODIUM CHLORIDE 0.9 % (FLUSH) 0.9 %
5-40 SYRINGE (ML) INJECTION PRN
Status: DISCONTINUED | OUTPATIENT
Start: 2025-07-08 | End: 2025-07-08 | Stop reason: HOSPADM

## 2025-07-08 RX ORDER — HEPARIN SODIUM 1000 [USP'U]/ML
INJECTION, SOLUTION INTRAVENOUS; SUBCUTANEOUS PRN
Status: DISCONTINUED | OUTPATIENT
Start: 2025-07-08 | End: 2025-07-08 | Stop reason: HOSPADM

## 2025-07-08 RX ORDER — CLOPIDOGREL BISULFATE 75 MG/1
75 TABLET ORAL DAILY
Qty: 90 TABLET | Refills: 3 | Status: SHIPPED | OUTPATIENT
Start: 2025-07-08

## 2025-07-08 RX ORDER — SODIUM CHLORIDE 0.9 % (FLUSH) 0.9 %
5-40 SYRINGE (ML) INJECTION EVERY 12 HOURS SCHEDULED
Status: DISCONTINUED | OUTPATIENT
Start: 2025-07-08 | End: 2025-07-08 | Stop reason: HOSPADM

## 2025-07-08 RX ORDER — SODIUM CHLORIDE 9 MG/ML
INJECTION, SOLUTION INTRAVENOUS CONTINUOUS
Status: DISCONTINUED | OUTPATIENT
Start: 2025-07-08 | End: 2025-07-08 | Stop reason: HOSPADM

## 2025-07-08 RX ADMIN — SODIUM CHLORIDE: 9 INJECTION, SOLUTION INTRAVENOUS at 08:34

## 2025-07-08 NOTE — DISCHARGE INSTRUCTIONS
Cath Labs at  [facility]   Discharge Instructions        7/8/2025  Reyes Ignacio   Date of Birth 1950       Activity:  No driving for 24 hours.  In 24 hours you may remove dressing and shower, wash site gently with soap and water and leave open to air  Avoid submerging your arm in sitting water for 5 days.  Do not use your right hand for 24 hours, then  No lifting more than 5 pounds for 5 days.   No lotions, powders, or ointments near site for 5 days.   No work/school for 5 days unless instructed otherwise by your cardiologist.    Diet:   Resume previous diet, if a cardiac diet is specified you will receive a handout with  general guidelines.   Drink extra non-alcoholic/decaffienated fluids for first 24 hours after your procedure.    Arm Management:  If bleeding occurs from the site or a hematoma (lump) begins to increase in size, apply pressure directly over the site, call 911 to return to the hospital.    Special Instructions:  Report any coolness or numbness in the arm  Report any chills, fever, itching, red bumps or rash   Report any of the following to the MD: drainage from the site, redness and/or swelling at the site, increased tenderness at the site   If you are currently taking Metformin or Metformin combination medications for Diabetes, hold your dose for 48 hours after your procedure.  Consult your Cardiologist before taking any NSAIDS, vitamin supplements, estrogen, or estrogen plus progestin.  Do not stop taking Plavix, Brilinta or Effient, without first consulting your cardiologist.    Sedation Discharge Instructions:  For the next 24 hours do not drive a car, operate machinery, power tools or kitchen appliances.    Do not drink alcohol; including beer or wine.    Do not make any important decisions or sign any important papers.  For the next 24 hours you can expect drowsiness, light-headed or dizziness, nausea/ vomiting, inability to concentrate, fatigue and desire to sleep.  We strongly

## 2025-07-08 NOTE — PROGRESS NOTES
Patient/family given discharge instructions. Patient/family verbalize understanding of discharge instructions, all questions addressed, copy given to patient/family. Pt transferred to vehicle via wheelchair to be discharged home with family.  Right radial site remains unremarkable. Arm board in place.  Pt continues to elevate RUE.  No c/o voiced.

## 2025-07-08 NOTE — H&P
History & Physical Update    The patient's History and Physical Note June 24, 2025 was reviewed with the patient and I examined the patient. There was no change. The plan is for coronary angiography +/- percutaneous coronary intervention.     Plan: The risks, benefits, expected outcome, and alternative to the recommended procedure have been discussed with the patient. Patient understands and wants to proceed with the procedure.     Electronically signed by Augusto Spann MD on 7/8/2025 at 8:46 AM

## 2025-07-08 NOTE — SEDATION DOCUMENTATION
[facility]                 Date of Procedure: 7/8/2025  Patient's Name: Reyes Ignacio  YOB: 1950   Medical Record Number: [id number]    Indication:  Unstable angina    Consent:   I have discussed with the patient and/or the patient representative the indication, alternatives, and the possible risks and/or complications of the planned procedure and the anesthesia methods. The patient and/or patient representative appear to understand and agree to proceed.    Past Medical History:   Diagnosis Date    BPH (benign prostatic hyperplasia)     CAD (coronary artery disease)     Chronic back pain     Gastric ulcer     past hx    GERD (gastroesophageal reflux disease)     Hyperlipidemia     Hypertension     MI (myocardial infarction) ([facility]) 06/1999    Osteoarthritis     Sciatic pain        Past Surgical History:   Procedure Laterality Date    CORONARY ANGIOPLASTY WITH STENT PLACEMENT      Total of 14 stents 1385-5884    CORONARY ARTERY BYPASS GRAFT  2012    INTRACAPSULAR CATARACT EXTRACTION Right 10/11/2019    PHACOEMULSIFICATION OF CATARACT RIGHT EYE WITH INTRAOCULAR LENS IMPLANT performed by Daljit Dickson MD at [facility] OR    INTRACAPSULAR CATARACT EXTRACTION Left 10/25/2019    PHACOEMULSIFICATION OF CATARACT LEFT EYE WITH INTRAOCULAR LENS IMPLANT performed by Daljit Dickson MD at [facility] OR    PAIN MANAGEMENT PROCEDURE Bilateral 11/10/2022    BILATERAL LUMBAR TWO EPIDURAL STEROID INJECTION SITE CONFIRMED BY FLUOROSCOPY performed by Terrence Joel MD at [facility] OR    SHOULDER ARTHROSCOPY  05/2010    SPINE SURGERY      spinal fusion-lower back    TONSILLECTOMY         Prior to Admission medications    Medication Sig Start Date End Date Taking? Authorizing Provider   ranolazine (RANEXA) 500 MG extended release tablet Take 1 tablet by mouth 2 times daily 6/24/25  Yes Blake Gomez MD   HYDROcodone-acetaminophen (NORCO)  MG per tablet Take 0.5 tablets by mouth 2 times daily for 30 days.

## 2025-07-08 NOTE — PROGRESS NOTES
Interventional Cardiology Chart Update    Patient underwent coronary angiography which showed chronic subtotal occlusion of the proximal LAD as well as 100%  of the proximal circumflex artery with bridging collaterals and distal LAWRENCE-3 flow in the circumflex.  The LAD was intervened upon with 1 drug-eluting stent successfully.  Full procedure note to follow.    Augusto Spann MD  Interventional Cardiology  [facility]  7/8/2025

## 2025-07-08 NOTE — TELEPHONE ENCOUNTER
Lois,  Patient had a PCI today and going home same day. Please help schedule the patient for a HSFU with Dr. Gomez.  Thank you.

## 2025-07-09 LAB — ECHO BSA: 2.01 M2

## 2025-07-09 NOTE — PROCEDURES
Cardiac Cath / Intervention Procedure Note    Date of procedure: 07/09/25    DIAGNOSTIC PROCEDURES PERFORMED:  Left heart catheterization with coronary angiography  Supervision and administration of moderate sedation for 75 minutes.      INTERVENTIONAL PROCEDURES PERFORMED:   PCI of the proximal LAD.     DESCRIPTION OF PROCEDURE:  Patient placed on cardiac monitor, pulse oximetry, and supplemental oxygen as necessary.  The area was prepped and draped in a sterile fashion and infiltrated with lidocaine 2%.  The right radial artery was accessed via the Seldinger technique using a micropuncture kit under fluoroscopic guidance.  The 6F sheath was inserted, and through this the catheters were advanced to the left coronary artery (LCA), right coronary artery (RCA) and left ventricle (LV).  Placement confirmed by fluoroscopy.  Angiography was subsequently performed and hemodynamics recorded (results are reported below).    After IV heparin was given, a EBU 3.5 catheter was advanced to the aortic root and engaged in the left coronary ostium.  A  50 was advanced past the lesion of interest and anchored in the distal vessel.     A 1.0 x 12 mm compressible balloon was advanced to the lesion of interest and balloon angioplasty was performed at a maximum of 12 MARIKA.  Following this, we advanced a 1.5 x 12 mm Takeru compressible balloon to the lesion and perform angioplasty at a maximum of 18 MARIKA.  Subsequently, we again performed balloon angioplasty with a 2.0 x 12 mm noncompressible balloon at a maximum of 18 MARIKA.  Balloon angioplasty was performed a fourth time with a 2.5 x 12 mm noncompressible balloon at a maximum of 20 MARIKA.  Following this, a 2.75 x 28 mm Synergy XD drug eluting stent was advanced to the lesion of interest & deployed at a maximum of 18 marika.   Post-dilation was performed with a 3.0 x 12 mm non-compressible balloon at a maximum of 22 marika.     Final angiography showed adequate stent apposition with LAWRENCE-3

## 2025-07-09 NOTE — TELEPHONE ENCOUNTER
7/9 Called [phone]. Spoke to pt. Scheduled for HSFU with [facility] on 7/23/25 at 2:15

## 2025-07-22 NOTE — PROGRESS NOTES
[facility]   Cardiac follow up     Referring Provider:  Daljit Sparks MD     Chief Complaint   Patient presents with    Follow-Up from Hospital    Hyperlipidemia    Coronary Artery Disease    Hypertension        Reyes Ignacio   1950     History of Present Illness:   Reyes Ignacio is a 75 y.o. male who is here today for follow up for a history of coronary artery disease- Multiple PCI of RCA in past including beta radiation. Recurrent restenosis RCA required 1V-CABG - SVG to PDA. PCI/AVA LAD 1/2015, hypertension, hyperlipidemia, cardiomyopathy. Echocardiogram from 2/17/2020 showed an EF of 45% with mild mitral and aortic regurgitation. [facility] showed Eustachian Wise is noted in the RA and mass growing in the Eustachian Shena can not be rules out, moderate mitral reg, EF 45%. He had a transesophageal echocardiogram performed on 08/03/2021 showing mild reduced to low normal LVEF, Mild MR, Mild AI, Prominent Eustachian valve, and Moderate size sessile mass attached RA free wall. A cardiac MRI was ordered and completed on 09/16/2021 Findings are consistent with an ischemic cardiomyopathy with mildly reduced LV function. No RA mass noted.   Patient was hospitalized from 05/15/2022-05/17/2022 after he presented to ED with complaints of chest pain and shortness of breath. Troponin <0.01 x 3. BNP elevated 723. EKG Sinus rhythm with occasional Premature ventricular complexes Septal infarct, age undeterminedST & T wave abnormality, consider inferior ischemia. He underwent LHC 05/15/2022 for unstable angina showing moderate to severe multivessel CAD, patent saphenous vein graft to RCA. Echocardiogram EF 55-60%, mild AR.    On 06/24/2025 he stated he has been feeling SOB with walking and also just rolling over in bed. Stated his SOB started 2 months ago. Stated while in vacation in [place] and felt SOB and had trouble keeping up with others on his bike. He stated he also has pain in

## 2025-07-23 ENCOUNTER — OFFICE VISIT (OUTPATIENT)
Dept: CARDIOLOGY CLINIC | Age: 75
End: 2025-07-23
Payer: MEDICARE

## 2025-07-23 VITALS
BODY MASS INDEX: 29.25 KG/M2 | HEIGHT: 66 IN | OXYGEN SATURATION: 94 % | DIASTOLIC BLOOD PRESSURE: 58 MMHG | SYSTOLIC BLOOD PRESSURE: 118 MMHG | WEIGHT: 182 LBS | HEART RATE: 60 BPM

## 2025-07-23 DIAGNOSIS — I25.110 ATHEROSCLEROSIS OF NATIVE CORONARY ARTERY OF NATIVE HEART WITH UNSTABLE ANGINA PECTORIS (HCC): ICD-10-CM

## 2025-07-23 DIAGNOSIS — I10 PRIMARY HYPERTENSION: ICD-10-CM

## 2025-07-23 PROCEDURE — 1159F MED LIST DOCD IN RCRD: CPT | Performed by: INTERNAL MEDICINE

## 2025-07-23 PROCEDURE — 1123F ACP DISCUSS/DSCN MKR DOCD: CPT | Performed by: INTERNAL MEDICINE

## 2025-07-23 PROCEDURE — 3074F SYST BP LT 130 MM HG: CPT | Performed by: INTERNAL MEDICINE

## 2025-07-23 PROCEDURE — 3078F DIAST BP <80 MM HG: CPT | Performed by: INTERNAL MEDICINE

## 2025-07-23 PROCEDURE — 99214 OFFICE O/P EST MOD 30 MIN: CPT | Performed by: INTERNAL MEDICINE

## 2025-07-23 RX ORDER — AMLODIPINE BESYLATE 5 MG/1
5 TABLET ORAL NIGHTLY
Qty: 90 TABLET | Refills: 3 | Status: SHIPPED | OUTPATIENT
Start: 2025-07-23 | End: 2025-07-23

## 2025-07-23 RX ORDER — FUROSEMIDE 20 MG/1
20 TABLET ORAL DAILY PRN
Qty: 30 TABLET | Refills: 1 | Status: SHIPPED | OUTPATIENT
Start: 2025-07-23 | End: 2025-07-23

## 2025-07-23 RX ORDER — VALSARTAN 160 MG/1
160 TABLET ORAL DAILY
Qty: 90 TABLET | Refills: 3 | Status: SHIPPED | OUTPATIENT
Start: 2025-07-23 | End: 2025-07-23

## 2025-07-23 RX ORDER — ATENOLOL 25 MG/1
25 TABLET ORAL DAILY
Qty: 90 TABLET | Refills: 3
Start: 2025-07-23

## 2025-07-23 RX ORDER — ATORVASTATIN CALCIUM 40 MG/1
40 TABLET, FILM COATED ORAL NIGHTLY
Qty: 90 TABLET | Refills: 3
Start: 2025-07-23

## 2025-07-23 RX ORDER — ATORVASTATIN CALCIUM 40 MG/1
40 TABLET, FILM COATED ORAL NIGHTLY
Qty: 90 TABLET | Refills: 3 | Status: SHIPPED | OUTPATIENT
Start: 2025-07-23 | End: 2025-07-23

## 2025-07-23 RX ORDER — ATENOLOL 25 MG/1
25 TABLET ORAL DAILY
Qty: 90 TABLET | Refills: 3 | Status: SHIPPED | OUTPATIENT
Start: 2025-07-23 | End: 2025-07-23

## 2025-07-23 RX ORDER — FUROSEMIDE 20 MG/1
20 TABLET ORAL DAILY PRN
Qty: 30 TABLET | Refills: 1
Start: 2025-07-23

## 2025-07-23 RX ORDER — AMLODIPINE BESYLATE 5 MG/1
5 TABLET ORAL NIGHTLY
Qty: 90 TABLET | Refills: 3
Start: 2025-07-23

## 2025-07-23 RX ORDER — VALSARTAN 160 MG/1
160 TABLET ORAL DAILY
Qty: 90 TABLET | Refills: 3
Start: 2025-07-23

## 2025-07-23 NOTE — PATIENT INSTRUCTIONS
Plan:  Patient is a former smoker. Educated on never smoking     ~ Stop Ranexa  ~ Okay to reduce Amlodipine back to 5 mg daily  ~ Continue taking Atorvastatin, Atenolol, Lasix, and Valsartan as prescribed.       Cardiac medications reviewed including indications and pertinent side effects. Medication list updated at this visit.   Patient verbalizes understanding of the need for treatment and education has been provided at today's visit. Additional education material will be provided in after visit summary.    Check blood pressure and heart rate at home a few times per week- keep a log with dates and times and bring to office visit   Regular exercise and following a healthy diet encouraged   Follow up with me in 6 months.

## 2025-08-19 ENCOUNTER — TELEPHONE (OUTPATIENT)
Dept: CARDIOLOGY CLINIC | Age: 75
End: 2025-08-19

## (undated) DEVICE — SOLUTION IV 1000ML LAC RINGERS PH 6.5 INJ USP VIAFLX PLAS

## (undated) DEVICE — CATHETER ANGIOPLSTY BAL L12MM DIA2MM GWIRE 0.014IN RAP EXCHG

## (undated) DEVICE — SURGICAL PROCEDURE PACK PPK8711] ALCON LABORATORIES INC]

## (undated) DEVICE — DEVICE INFL W/HEM VLV TORQUE

## (undated) DEVICE — COPILOT BLEEDBACK CONTROL VALVE: Brand: COPILOT

## (undated) DEVICE — CATHETER DIAG AD 6FR L110CM 145DEG COR GRN HYDRPHLC NYL

## (undated) DEVICE — SURGICAL PROCEDURE PACK EYE ANDRSN

## (undated) DEVICE — GUIDEWIRE VASC L260CM DIA0.035IN RAD 3MM J TIP L7CM PTFE

## (undated) DEVICE — ELECTRODE,ECG,STRESS,FOAM,3PK: Brand: MEDLINE

## (undated) DEVICE — SOLUTION IRRIG 250ML STRL H2O PLAS POUR BTL USP

## (undated) DEVICE — CATH LAB PACK: Brand: MEDLINE INDUSTRIES, INC.

## (undated) DEVICE — 3M™ TEGADERM™ TRANSPARENT FILM DRESSING FRAME STYLE, 1624W, 2-3/8 IN X 2-3/4 IN (6 CM X 7 CM), 100/CT 4CT/CASE: Brand: 3M™ TEGADERM™

## (undated) DEVICE — SET ADMIN PRIMING 7ML L30IN 7.35LB 20 GTT 2ND RLER CLMP

## (undated) DEVICE — Device

## (undated) DEVICE — NEEDLE ANES 23GA L1.5IN RETROBLB

## (undated) DEVICE — STERILE POLYISOPRENE POWDER-FREE SURGICAL GLOVES: Brand: PROTEXIS

## (undated) DEVICE — GLOVE SURG SZ 65 L12IN FNGR THK94MIL STD WHT LTX FREE

## (undated) DEVICE — CATH BLLN ANGIO 2.50X15MM NC EUPHORIA RX

## (undated) DEVICE — AIRLIFE™ NASAL OXYGEN CANNULA CURVED, FLARED TIP, WITH 7 FEET (2.1 M) CRUSH RESISTANT TUBING, OVER-THE-EAR STYLE: Brand: AIRLIFE™

## (undated) DEVICE — PATIENT CARE KIT EYE POST OP

## (undated) DEVICE — GAUZE,SPONGE,4"X4",16PLY,STRL,LF,10/TRAY: Brand: MEDLINE

## (undated) DEVICE — NC TREK NEO™ CORONARY DILATATION CATHETER 3.00 MM X 12 MM / RAPID-EXCHANGE: Brand: NC TREK NEO™

## (undated) DEVICE — CATHETER GUID 6FR L100CM DIA0.071IN NYL SHFT EBU3.5

## (undated) DEVICE — CATHETER IV 20GA L1.25IN PNK FEP SFTY STR HUB RADPQ DISP

## (undated) DEVICE — RUNTHROUGH NS EXTRA FLOPPY PTCA GUIDEWIRE: Brand: RUNTHROUGH

## (undated) DEVICE — SET GRAV VENT NVENT CK VLV 3 NDL FREE PRT 10 GTT

## (undated) DEVICE — APPLICATOR PREP 26ML 0.7% IOD POVACRYLEX 74% ISO ALC ST

## (undated) DEVICE — SILICONE I/A TIP STRAIGHT: Brand: ALCON

## (undated) DEVICE — CATHETER BLLN SAPPHIRE II PRO 1.0 X15MM

## (undated) DEVICE — SOLUTION IV HEPARIN SODIUM SODIUM CHL 0.9% 500 ML INJ VIAFLX

## (undated) DEVICE — Device: Brand: MALYUGIN RING SYSTEM 2.0, 7.0MM

## (undated) DEVICE — GLOVE,SURG,SENSICARE,ALOE,LF,PF,7: Brand: MEDLINE

## (undated) DEVICE — UNIVERSAL BLOCK TRAY: Brand: MEDLINE INDUSTRIES, INC.

## (undated) DEVICE — HI-TORQUE PILOT 50 GUIDE WIRE .014 STRAIGHT TIP 3.0 CM X 190 CM: Brand: HI-TORQUE PILOT

## (undated) DEVICE — CATHETER ANGIO L100CM DIA5FR MP A CRV COR INSLIDE ADD DBL

## (undated) DEVICE — GOWN,SIRUS,NON REINFRCD,LARGE,SET IN SL: Brand: MEDLINE

## (undated) DEVICE — CATHETER ANGIOPLSTY BAL L12MM DIA1.5MM GWIRE 0.014IN RAP

## (undated) DEVICE — ALCOHOL RUBBING 16OZ 70% ISO

## (undated) DEVICE — TR BAND RADIAL ARTERY COMPRESSION DEVICE: Brand: TR BAND

## (undated) DEVICE — TOWEL,OR,DSP,ST,BLUE,STD,4/PK,20PK/CS: Brand: MEDLINE

## (undated) DEVICE — GLIDESHEATH SLENDER STAINLESS STEEL KIT: Brand: GLIDESHEATH SLENDER

## (undated) DEVICE — KIT HND CTRL 3 W STPCOCK ROT END 54IN PREM HI PRSS TBNG AT

## (undated) DEVICE — GUIDE EXTENSION CATHETER: Brand: GUIDEZILLA™ II

## (undated) DEVICE — TOWEL OR BLUEE 16X26IN ST 8 PACK ORB08 16X26ORTWL

## (undated) DEVICE — CATHETER DIAG 5FR L100CM LUMN ID0.047IN JL4 CRV 0 SIDE H